# Patient Record
Sex: FEMALE | Race: WHITE | NOT HISPANIC OR LATINO
[De-identification: names, ages, dates, MRNs, and addresses within clinical notes are randomized per-mention and may not be internally consistent; named-entity substitution may affect disease eponyms.]

---

## 2017-02-10 ENCOUNTER — APPOINTMENT (OUTPATIENT)
Dept: NEUROLOGY | Facility: CLINIC | Age: 42
End: 2017-02-10

## 2018-12-11 ENCOUNTER — EMERGENCY (EMERGENCY)
Facility: HOSPITAL | Age: 43
LOS: 1 days | Discharge: HOME | End: 2018-12-11
Attending: EMERGENCY MEDICINE
Payer: COMMERCIAL

## 2018-12-11 VITALS
SYSTOLIC BLOOD PRESSURE: 136 MMHG | DIASTOLIC BLOOD PRESSURE: 82 MMHG | TEMPERATURE: 99 F | WEIGHT: 130.07 LBS | OXYGEN SATURATION: 99 % | RESPIRATION RATE: 18 BRPM | HEIGHT: 66 IN | HEART RATE: 98 BPM

## 2018-12-11 PROCEDURE — 99283 EMERGENCY DEPT VISIT LOW MDM: CPT

## 2018-12-11 PROCEDURE — 99284 EMERGENCY DEPT VISIT MOD MDM: CPT

## 2018-12-11 RX ORDER — ACETAMINOPHEN 500 MG
975 TABLET ORAL ONCE
Qty: 0 | Refills: 0 | Status: COMPLETED | OUTPATIENT
Start: 2018-12-11 | End: 2018-12-11

## 2018-12-11 RX ADMIN — Medication 975 MILLIGRAM(S): at 18:44

## 2018-12-11 NOTE — ED PROVIDER NOTE - NS ED ROS FT
ROS: no CP/SOB. no cough. no fever. no n/v/d/c. no abd pain. no rash. no bleeding. no urinary complaints. no weakness. no vision changes. +HA. no neck/back pain. no extremity swelling/deformity. No change in mental status.

## 2018-12-11 NOTE — ED PROVIDER NOTE - MEDICAL DECISION MAKING DETAILS
NEXUS/[place] CT rule negative, exam without any significant sign of head/facial trauma. no concern for sull fx or ICH. no concern for cervical spine injury. will give tylenol/ice. D/C
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
## 2018-12-11 NOTE — ED PROVIDER NOTE - PHYSICAL EXAMINATION
Gen: NAD, tearful, Ox3  Head: NCAT, no hematoma, bony step off  HEENT: PERRL, EOMI, oral mucosa moist, normal conjunctiva, neck supple, no hemotympanum, no racoon eye/bateman sign, mild ttp rt temporal region without step off, jaw lined up nontender  Lung: CTAB, no respiratory distress  CV: rrr, no murmur, Normal perfusion  Abd: soft, NTND  MSK: No edema, no visible deformities, no midline spinal ttp, mild rt trapezius ttp  Neuro: No focal neurologic deficits, CN II-XII intact, 5/5 global strength, sensation intact, gait intact   Skin: No rash   Psych: normal affect
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
## 2019-09-24 ENCOUNTER — TRANSCRIPTION ENCOUNTER (OUTPATIENT)
Age: 44
End: 2019-09-24

## 2019-11-11 NOTE — ED PROVIDER NOTE - PLAN OF CARE
1. Return to ED for worsening, progressive or any other concerning symptoms   2. Follow up with your primary care doctor in 2-3days   3. Take motrin 600mg every 6 hours as needed for pain and Take Tylenol up to 650 mg every 6 hours as needed for pain. No

## 2020-02-10 NOTE — ED ADULT NURSE NOTE - CAS DISCH ACCOMP BY
Detail Level: Detailed Quality 131: Pain Assessment And Follow-Up: Pain assessment NOT documented as being performed, documentation the patient is not eligible for a pain assessment using a standardized tool Quality 110: Preventive Care And Screening: Influenza Immunization: Influenza Immunization Administered during Influenza season Family

## 2021-03-05 PROBLEM — F41.9 ANXIETY DISORDER, UNSPECIFIED: Chronic | Status: ACTIVE | Noted: 2018-12-11

## 2021-05-24 ENCOUNTER — APPOINTMENT (OUTPATIENT)
Dept: RHEUMATOLOGY | Facility: CLINIC | Age: 46
End: 2021-05-24
Payer: MEDICAID

## 2021-05-24 VITALS
DIASTOLIC BLOOD PRESSURE: 80 MMHG | WEIGHT: 130 LBS | BODY MASS INDEX: 20.89 KG/M2 | HEART RATE: 82 BPM | OXYGEN SATURATION: 98 % | RESPIRATION RATE: 14 BRPM | SYSTOLIC BLOOD PRESSURE: 124 MMHG | HEIGHT: 66 IN | TEMPERATURE: 98.3 F

## 2021-05-24 PROCEDURE — 99203 OFFICE O/P NEW LOW 30 MIN: CPT

## 2021-05-25 NOTE — ASSESSMENT
[FreeTextEntry1] : Symptoms suggestive of fibromyalgia\par \par  -labs as below\par - encouraged proper diet, good sleep hygiene, exercise \par - c/w nortriptyline 100 mg daily, tizanidine prn per neurology\par - consider trail with gabapentin\par \par Discussed treatment plan with the patient. The patient was given the opportunity to ask questions and all questions were answered to their satisfaction.

## 2021-05-25 NOTE — HISTORY OF PRESENT ILLNESS
[FreeTextEntry1] : 45 year old female with PMH as listed below presents today for an initial evaluation\par \par Reports TBI 2018. Since then reports to have diffuse polyarthralgias, myalgia, fatigue. Denies swelling to the joints. Reports diffuse- pins/needles sensation. Following neurology- on nortriptyline 100 mg daily, tizanidine prn, cognitive therapy. Has had EMG tests: unremarkable per pt. \par \par Had COVID- 19 infection 02/2020. No hypoxia. No hospitalization. Reports symptoms progressively worse since. \par Symptoms start in AM and lasts the whole day. Dull/aching/burning/ pins/needles sensation.\par \par +mental fog\par +sicca symptoms\par \par denies fever, chills, denies chest pain, chest palpitations, denies sob, denies nausea, vomiting, abdominal pain, diarrhea, denies rashes,  denies blood clots,  raynauds

## 2021-05-25 NOTE — PHYSICAL EXAM
[General Appearance - Alert] : alert [General Appearance - In No Acute Distress] : in no acute distress [General Appearance - Well Nourished] : well nourished [General Appearance - Well Developed] : well developed [Sclera] : the sclera and conjunctiva were normal [PERRL With Normal Accommodation] : pupils were equal in size, round, and reactive to light [Extraocular Movements] : extraocular movements were intact [Outer Ear] : the ears and nose were normal in appearance [Neck Appearance] : the appearance of the neck was normal [Jugular Venous Distention Increased] : there was no jugular-venous distention [Auscultation Breath Sounds / Voice Sounds] : lungs were clear to auscultation bilaterally [Apical Impulse] : the apical impulse was normal [Heart Rate And Rhythm] : heart rate was normal and rhythm regular [Heart Sounds] : normal S1 and S2 [Bowel Sounds] : normal bowel sounds [Abdomen Soft] : soft [Abdomen Tenderness] : non-tender [Abdomen Mass (___ Cm)] : no abdominal mass palpated [No CVA Tenderness] : no ~M costovertebral angle tenderness [No Spinal Tenderness] : no spinal tenderness [Nail Clubbing] : no clubbing  or cyanosis of the fingernails [Motor Tone] : muscle strength and tone were normal [] : no rash [Skin Lesions] : no skin lesions [Sensation] : the sensory exam was normal to light touch and pinprick [Motor Exam] : the motor exam was normal [No Focal Deficits] : no focal deficits [Oriented To Time, Place, And Person] : oriented to person, place, and time [FreeTextEntry1] : +multiple tender points

## 2021-06-10 LAB
25(OH)D3 SERPL-MCNC: 39.3 NG/ML
ALBUMIN SERPL ELPH-MCNC: 4.7 G/DL
ALP BLD-CCNC: 58 U/L
ALT SERPL-CCNC: 17 U/L
ANA PAT FLD IF-IMP: ABNORMAL
ANA SER IF-ACNC: ABNORMAL
ANION GAP SERPL CALC-SCNC: 13 MMOL/L
AST SERPL-CCNC: 20 U/L
BASOPHILS # BLD AUTO: 0.07 K/UL
BASOPHILS NFR BLD AUTO: 1.5 %
BILIRUB SERPL-MCNC: 0.2 MG/DL
BUN SERPL-MCNC: 12 MG/DL
C3 SERPL-MCNC: 106 MG/DL
C4 SERPL-MCNC: 20 MG/DL
CALCIUM SERPL-MCNC: 9.9 MG/DL
CCP AB SER IA-ACNC: <8 UNITS
CHLORIDE SERPL-SCNC: 100 MMOL/L
CK SERPL-CCNC: 122 U/L
CO2 SERPL-SCNC: 26 MMOL/L
CREAT SERPL-MCNC: 0.97 MG/DL
CREAT SPEC-SCNC: 186 MG/DL
CREAT/PROT UR: 0.1 RATIO
CRP SERPL-MCNC: <3 MG/L
DSDNA AB SER-ACNC: <12 IU/ML
ENA RNP AB SER IA-ACNC: <0.2 AL
ENA SM AB SER IA-ACNC: <0.2 AL
ENA SS-A AB SER IA-ACNC: <0.2 AL
ENA SS-B AB SER IA-ACNC: <0.2 AL
EOSINOPHIL # BLD AUTO: 0.09 K/UL
EOSINOPHIL NFR BLD AUTO: 2 %
ERYTHROCYTE [SEDIMENTATION RATE] IN BLOOD BY WESTERGREN METHOD: 11 MM/HR
GLUCOSE SERPL-MCNC: 96 MG/DL
HCT VFR BLD CALC: 41.4 %
HGB BLD-MCNC: 12.7 G/DL
HISTONE AB SER QL: 0.6 UNITS
IMM GRANULOCYTES NFR BLD AUTO: 0.2 %
LYMPHOCYTES # BLD AUTO: 1.32 K/UL
LYMPHOCYTES NFR BLD AUTO: 28.7 %
MAN DIFF?: NORMAL
MCHC RBC-ENTMCNC: 30 PG
MCHC RBC-ENTMCNC: 30.7 GM/DL
MCV RBC AUTO: 97.6 FL
MONOCYTES # BLD AUTO: 0.41 K/UL
MONOCYTES NFR BLD AUTO: 8.9 %
NEUTROPHILS # BLD AUTO: 2.7 K/UL
NEUTROPHILS NFR BLD AUTO: 58.7 %
PLATELET # BLD AUTO: 308 K/UL
POTASSIUM SERPL-SCNC: 4.5 MMOL/L
PROT SERPL-MCNC: 7.3 G/DL
PROT UR-MCNC: 12 MG/DL
RBC # BLD: 4.24 M/UL
RBC # FLD: 13.1 %
RF+CCP IGG SER-IMP: NEGATIVE
RHEUMATOID FACT SER QL: <10 IU/ML
SODIUM SERPL-SCNC: 138 MMOL/L
THYROPEROXIDASE AB SERPL IA-ACNC: <10 IU/ML
TSH SERPL-ACNC: 2.19 UIU/ML
WBC # FLD AUTO: 4.6 K/UL

## 2021-06-11 ENCOUNTER — APPOINTMENT (OUTPATIENT)
Dept: RHEUMATOLOGY | Facility: CLINIC | Age: 46
End: 2021-06-11
Payer: MEDICAID

## 2021-06-11 VITALS
WEIGHT: 128 LBS | RESPIRATION RATE: 17 BRPM | OXYGEN SATURATION: 98 % | HEART RATE: 77 BPM | BODY MASS INDEX: 20.57 KG/M2 | TEMPERATURE: 98 F | HEIGHT: 66 IN | DIASTOLIC BLOOD PRESSURE: 82 MMHG | SYSTOLIC BLOOD PRESSURE: 110 MMHG

## 2021-06-11 PROCEDURE — 99214 OFFICE O/P EST MOD 30 MIN: CPT

## 2021-06-11 RX ORDER — DEXTROAMPHETAMINE SACCHARATE, AMPHETAMINE ASPARTATE, DEXTROAMPHETAMINE SULFATE, AND AMPHETAMINE SULFATE 5; 5; 5; 5 MG/1; MG/1; MG/1; MG/1
20 TABLET ORAL
Refills: 0 | Status: ACTIVE | COMMUNITY

## 2021-06-11 RX ORDER — LURASIDONE HYDROCHLORIDE 40 MG/1
40 TABLET, FILM COATED ORAL
Refills: 0 | Status: ACTIVE | COMMUNITY

## 2021-06-11 RX ORDER — LAMOTRIGINE 200 MG/1
200 TABLET ORAL
Refills: 0 | Status: ACTIVE | COMMUNITY

## 2021-06-11 NOTE — HISTORY OF PRESENT ILLNESS
[FreeTextEntry1] : Pt presenting today for a f.u visit. \par Labs from 05/2021 reviewed with pt. \par Continues to have  diffuse polyarthralgias, myalgia, <<fatigue.  ++brain fog\par denies fever, chills, denies chest pain, chest palpitations, denies sob, denies nausea, vomiting, abdominal pain, denies rashes\par No acute complaints today

## 2021-06-11 NOTE — ASSESSMENT
[FreeTextEntry1] : Symptoms suggestive of fibromyalgia\par \par - encouraged proper diet, good sleep hygiene, exercise \par - c/w nortriptyline 100 mg daily, tizanidine prn per neurology\par - start Lyrica 50mg BID (reviewed risk and benefits of medications)\par \par Discussed treatment plan with the patient. The patient was given the opportunity to ask questions and all questions were answered to their satisfaction.

## 2021-07-06 ENCOUNTER — APPOINTMENT (OUTPATIENT)
Dept: RHEUMATOLOGY | Facility: CLINIC | Age: 46
End: 2021-07-06
Payer: MEDICAID

## 2021-07-06 VITALS
HEART RATE: 70 BPM | HEIGHT: 66 IN | OXYGEN SATURATION: 99 % | DIASTOLIC BLOOD PRESSURE: 80 MMHG | TEMPERATURE: 98 F | WEIGHT: 130 LBS | SYSTOLIC BLOOD PRESSURE: 110 MMHG | BODY MASS INDEX: 20.89 KG/M2 | RESPIRATION RATE: 17 BRPM

## 2021-07-06 PROCEDURE — 99213 OFFICE O/P EST LOW 20 MIN: CPT

## 2021-07-06 NOTE — HISTORY OF PRESENT ILLNESS
[FreeTextEntry1] : Pt presenting today for a f.u visit. \par Recently started Lyrica 3 days ago and has noted improvement in symptoms so far. \par Continues to have  diffuse polyarthralgias, myalgia, <<fatigue.  ++brain fog\par denies fever, chills, denies chest pain, chest palpitations, denies sob, denies nausea, vomiting, abdominal pain, denies rashes\par No acute complaints today

## 2021-07-06 NOTE — ASSESSMENT
[FreeTextEntry1] : Symptoms suggestive of fibromyalgia\par \par - encouraged proper diet, good sleep hygiene, exercise \par - c/w nortriptyline 100 mg daily, tizanidine prn per neurology\par - c/w Lyrica 50mg BID (reviewed risk and benefits of medications)\par \par Discussed treatment plan with the patient. The patient was given the opportunity to ask questions and all questions were answered to their satisfaction.

## 2021-07-26 RX ORDER — PREGABALIN 50 MG/1
50 CAPSULE ORAL
Qty: 60 | Refills: 0 | Status: DISCONTINUED | COMMUNITY
Start: 2021-06-11 | End: 2021-07-26

## 2021-07-26 RX ORDER — NORTRIPTYLINE HYDROCHLORIDE 50 MG/1
50 CAPSULE ORAL
Refills: 0 | Status: DISCONTINUED | COMMUNITY
End: 2021-07-26

## 2021-08-24 ENCOUNTER — RX RENEWAL (OUTPATIENT)
Age: 46
End: 2021-08-24

## 2021-09-15 ENCOUNTER — APPOINTMENT (OUTPATIENT)
Dept: RHEUMATOLOGY | Facility: CLINIC | Age: 46
End: 2021-09-15

## 2021-10-08 ENCOUNTER — APPOINTMENT (OUTPATIENT)
Dept: RHEUMATOLOGY | Facility: CLINIC | Age: 46
End: 2021-10-08

## 2021-11-17 ENCOUNTER — APPOINTMENT (OUTPATIENT)
Dept: RHEUMATOLOGY | Facility: CLINIC | Age: 46
End: 2021-11-17
Payer: MEDICAID

## 2021-11-17 VITALS
OXYGEN SATURATION: 99 % | DIASTOLIC BLOOD PRESSURE: 80 MMHG | SYSTOLIC BLOOD PRESSURE: 140 MMHG | BODY MASS INDEX: 21.69 KG/M2 | WEIGHT: 135 LBS | TEMPERATURE: 98.4 F | HEART RATE: 80 BPM | RESPIRATION RATE: 17 BRPM | HEIGHT: 66 IN

## 2021-11-17 PROCEDURE — 99213 OFFICE O/P EST LOW 20 MIN: CPT

## 2021-11-23 NOTE — HISTORY OF PRESENT ILLNESS
[FreeTextEntry1] : Pt presenting today for a f.u visit. No acute complaints today. \par Off Lyrica- reports getting very drowsy. \par On gabapentin 300mg BID,  nortriptyline 100 mg daily, tizanidine prn per neurology. \par Continues to have  diffuse polyarthralgias, myalgia, <<fatigue.  ++brain fog\par denies fever, chills, denies chest pain, chest palpitations, denies sob, denies nausea, vomiting, abdominal pain, denies rashes. 
English

## 2022-01-21 ENCOUNTER — APPOINTMENT (OUTPATIENT)
Dept: RHEUMATOLOGY | Facility: CLINIC | Age: 47
End: 2022-01-21
Payer: MEDICAID

## 2022-01-21 PROCEDURE — 99214 OFFICE O/P EST MOD 30 MIN: CPT | Mod: 95

## 2022-01-21 NOTE — ASSESSMENT
[FreeTextEntry1] : Fibromyalgia\par \par Off Lyrica- reports getting very drowsy. \par \par - encouraged proper diet, good sleep hygiene, exercise \par - c/w nortriptyline 100 mg daily, tizanidine prn per neurology\par - c/w gabapentin to 300mg in AM, 600 mg in PM\par - meloxicam 15mg daily \par \par Discussed treatment plan with the patient. The patient was given the opportunity to ask questions and all questions were answered to their satisfaction.

## 2022-01-21 NOTE — HISTORY OF PRESENT ILLNESS
[Home] : at home, [unfilled] , at the time of the visit. [Medical Office: ([facility])___] : at the medical office located in  [Verbal consent obtained from patient] : the patient, [unfilled] [FreeTextEntry1] : Pt presenting today for a f.u visit. No acute complaints today. \par Off Lyrica- reports getting very drowsy. \par On gabapentin 300mg in AM, 600 mg in PM. \par Also taking nortriptyline 100 mg daily, tizanidine prn per neurology. \par Takes NSAIDS, Tylenol prn for pain\par denies fever, chills, denies chest pain, chest palpitations, denies sob, denies nausea, vomiting, abdominal pain, denies rashes.

## 2022-04-19 ENCOUNTER — APPOINTMENT (OUTPATIENT)
Dept: ORTHOPEDIC SURGERY | Facility: CLINIC | Age: 47
End: 2022-04-19
Payer: MEDICARE

## 2022-04-19 VITALS — BODY MASS INDEX: 21.69 KG/M2 | WEIGHT: 135 LBS | HEIGHT: 66 IN

## 2022-04-19 DIAGNOSIS — Z78.9 OTHER SPECIFIED HEALTH STATUS: ICD-10-CM

## 2022-04-19 PROCEDURE — 72040 X-RAY EXAM NECK SPINE 2-3 VW: CPT

## 2022-04-19 PROCEDURE — 73030 X-RAY EXAM OF SHOULDER: CPT | Mod: LT

## 2022-04-19 PROCEDURE — 99204 OFFICE O/P NEW MOD 45 MIN: CPT

## 2022-04-19 NOTE — PHYSICAL EXAM
[de-identified] : Neurologic: normal coordination, normal DTR UE/LE , normal sensation, normal mood and affect, orientated and able to communicate. \par Skin: normal skin, no rash, no ulcers and no lesions. \par Lymphatic: no obvious lymphadenopathy in areas examined. \par Constitutional: well developed and well nourished. \par Cardiovascular: peripheral vascular exam is grossly normal. \par Pulmonary: no respiratory distress, lungs clear to auscultation bilaterally. \par Abdomen: normal bowel sounds, non-tender, no HSM and no mass. \par \par Cervical Spine: Central paraspinal tenderness\par Left-sided radicular paresthesia and pain \par X-ray of c-spine, 2 view: kyphosis and spasm\par \par Left shoulder x-ray, 3view: ac arthrosis \par \par \par

## 2022-04-19 NOTE — DISCUSSION/SUMMARY
[de-identified] : Follow up with Dr. Hare after MRI of cervical spine to eval HNP \par \par Patient will continue physical therapy - added specific shoulder exercises to be done in prescription\par \par Reviewed all images with patient. \par \par Home Exercise\par The patient is instructed on a home exercise program.\par \par ENE HERNANDEZ Acting as a Scribe for Dr. Mixon\par I, Ene Hernandez, attest that this documentation has been prepared under the direction and in the presence of Provider Bernard Mixon MD.\par \par Activity Modification\par The patient was advised to modify their activities.\par \par Dx / Natural History\par The patient was advised of the diagnosis.  The natural history of the pathology was explained in full to the patient in layman's terms.  Several different treatment options were discussed and explained in full to the patient including the risks and benefits of both surgical and non-surgical treatments.  All questions and concerns were answered.\par \par Pain Guide Activities\par The patient was advised to let pain guide the gradual advancement of activities.\par \par RICE\par I explained to the patient that rest, ice, compression, and elevation would benefit them.  They may return to activity after follow-up or when they no longer have any pain.

## 2022-04-19 NOTE — HISTORY OF PRESENT ILLNESS
[de-identified] : The patient is a 46 year old R hand dominant F who presents today complaining of left shoulder pain.  Left superior shoulder pain since February, now with tightening of her shoulder. Pain worsened over last 10 days or so. Pain radiating down her shoulder blade. Pain with all range of motion. Admits to intermittent tingling in forearm.  Here with [facility] MRI of left shoulder.\par Date of Injury/Onset: 02/2022\par Pain:    At Rest: 5/10 \par With Activity:  9/10 \par Mechanism of injury: none\par This is not a Work Related Injury being treated under Worker's Compensation.\par This is not an athletic injury occurring associated with an interscholastic or organized sports team.\par Quality of symptoms: throbbing , aching , pulling \par Improves with: rest, ice \par Worse with: reaching , lifting , pushing \par Prior treatment: none\par Prior Imaging: L shoulder MRI - [facility] \par Out of work/sport: _, since _\par School/Sport/Position/Occupation: Disabled \par Additional Information: None\par  \par

## 2022-04-19 NOTE — ASSESSMENT
[FreeTextEntry1] : 4/5/22 [facility] MRI LEFT SHOULDER\par No rotator cuff or labral tear.\par Mild AC arthrosis and bone marrow edema.

## 2022-04-26 ENCOUNTER — RESULT REVIEW (OUTPATIENT)
Age: 47
End: 2022-04-26

## 2022-05-03 ENCOUNTER — APPOINTMENT (OUTPATIENT)
Dept: ORTHOPEDIC SURGERY | Facility: CLINIC | Age: 47
End: 2022-05-03
Payer: MEDICARE

## 2022-05-03 VITALS — HEIGHT: 66 IN | WEIGHT: 135 LBS | BODY MASS INDEX: 21.69 KG/M2

## 2022-05-03 DIAGNOSIS — R20.2 PARESTHESIA OF SKIN: ICD-10-CM

## 2022-05-03 PROCEDURE — 20611 DRAIN/INJ JOINT/BURSA W/US: CPT

## 2022-05-03 PROCEDURE — 99214 OFFICE O/P EST MOD 30 MIN: CPT | Mod: 25

## 2022-05-03 NOTE — HISTORY OF PRESENT ILLNESS
[de-identified] : The patient is a 46 year old R hand dominant F who presents today complaining of left shoulder pain.\par Date of Injury/Onset: 02/2022\par Pain: At Rest: 3-4/10 \par With Activity: 9/10 \par Mechanism of injury: none\par This is not a Work Related Injury being treated under Worker's Compensation.\par This is not an athletic injury occurring associated with an interscholastic or organized sports team.\par Quality of symptoms: throbbing , aching , pulling \par Improves with: rest, ice \par Worse with: reaching , lifting , pushing \par Prior treatment: none\par Prior Imaging: L shoulder MRI - [facility] \par Out of work/sport: _, since _\par School/Sport/Position/Occupation: Disabled \par Additional Information: None\par

## 2022-05-03 NOTE — ASSESSMENT
[FreeTextEntry1] : 4/5/22 [facility] MRI LEFT SHOULDER\par No rotator cuff or labral tear.\par Mild AC arthrosis and bone marrow edema. \par \par MRI C-SPINE [facility] 4/26/22\par Multilevel degenerative change without significant spinal canal stenosis.\par Mild neuroforaminal stenosis on the right at C4-C5 and C5/C6.

## 2022-05-03 NOTE — PHYSICAL EXAM
[de-identified] : Neurologic: normal coordination, normal DTR UE/LE , normal sensation, normal mood and affect, orientated and able to communicate. \par Skin: normal skin, no rash, no ulcers and no lesions. \par Lymphatic: no obvious lymphadenopathy in areas examined. \par Constitutional: well developed and well nourished. \par Cardiovascular: peripheral vascular exam is grossly normal. \par Pulmonary: no respiratory distress, lungs clear to auscultation bilaterally. \par Abdomen: normal bowel sounds, non-tender, no HSM and no mass. \par \par Cervical Spine: Central paraspinal tenderness\par Left-sided radicular paresthesia and pain \par X-ray of c-spine, 2 view: kyphosis and spasm\par \par Left Shoulder: AC joint  tenderness \par Left shoulder x-ray, 3view: ac arthrosis \par \par \par

## 2022-05-03 NOTE — DISCUSSION/SUMMARY
[Medication Risks Reviewed] : Medication risks reviewed [de-identified] : Reviewed all images with patient. \par \par *LEFT AC JOINT*  corticosteroid injection administered, using ultrasound for proper placement. \par The risks, benefits, and alternatives to cortisone injection were explained in full to the patient.  Risks outlined include but are not limited to infection, sepsis, bleeding, scarring, skin discoloration, temporary increase in pain, syncopal episode, failure to resolve symptoms, allergic reaction, symptom recurrence, and elevation of blood sugar in diabetics.  Patient understood the risks.  All questions were answered.  After discussion of options, patient requested an injection.  Oral informed consent was obtained and sterile prep was done of the injection site.  A mixture of 40mg of Kenalog, 3cc of 1% Lidocaine was sterilely prepared by me.  Sterile technique was used to introduce the mixture into the left shoulder. Ultrasound was used for proper needle placement.  Patient tolerated the procedure well.  Advised to ice the injection site this evening. \par \par Referred patient to physical therapy for strengthening. \par \par Follow up as needed. \par \par -----------------------------------------------\par Home Exercise\par The patient is instructed on a home exercise program.\par \par ENE HERNANDEZ Acting as a Scribe for Dr. Mixon\par I, Ene Hernandez, attest that this documentation has been prepared under the direction and in the presence of Provider Bernard Mixon MD.\par \par Activity Modification\par The patient was advised to modify their activities.\par \par Dx / Natural History\par The patient was advised of the diagnosis.  The natural history of the pathology was explained in full to the patient in layman's terms.  Several different treatment options were discussed and explained in full to the patient including the risks and benefits of both surgical and non-surgical treatments.  All questions and concerns were answered.\par \par Pain Guide Activities\par The patient was advised to let pain guide the gradual advancement of activities.\par \par RICE\par I explained to the patient that rest, ice, compression, and elevation would benefit them.  They may return to activity after follow-up or when they no longer have any pain.

## 2022-05-17 ENCOUNTER — APPOINTMENT (OUTPATIENT)
Dept: PAIN MANAGEMENT | Facility: CLINIC | Age: 47
End: 2022-05-17
Payer: MEDICARE

## 2022-05-17 VITALS — WEIGHT: 135 LBS | BODY MASS INDEX: 21.69 KG/M2 | HEIGHT: 66 IN

## 2022-05-17 PROCEDURE — 99203 OFFICE O/P NEW LOW 30 MIN: CPT

## 2022-05-17 NOTE — DATA REVIEWED
[MRI] : MRI [Cervical Spine] : cervical spine [Report was reviewed and noted in the chart] : The report was reviewed and noted in the chart [I reviewed the films/CD] : I reviewed the films/CD

## 2022-05-18 NOTE — REASON FOR VISIT
[Initial Consultation] : an initial pain management consultation [FreeTextEntry2] : Ref. From Dr. Mixon

## 2022-05-18 NOTE — ASSESSMENT
[FreeTextEntry1] : A discussion regarding available pain management treatment options occurred with the patient.  These included interventional, rehabilitative, pharmacological, and alternative modalities. We will proceed with the following: \par \par Interventional treatment options: \par - Discussed trial of TPI for cervical myofascial pain with failure of conservative care \par - Briefly discussed cervical facet blocks / RFA with refractory symptoms \par - see additional instructions below \par \par Rehabilitative options: \par - initiate trial of physical therapy \par - participation in active HEP was discussed \par \par Medication based treatment options: \par - continue OTC NSAIDs PRN \par - would incorporate OTC analgesics PRN \par - caution with any potentially sedating rx: due to ongoing neurological workup (new onset seizure?) \par - see additional instructions below \par \par Complementary treatment options: \par - Lifestyle modifications discussed\par \par Additional treatment recommendations as follows: \par - patient with follow up 6-8 weeks to assess response to conservative care \par \par The documentation recorded by the scribe, in my presence, accurately reflects the service I personally performed and the decisions made by me with my edits as appropriate.\par \par I, Kalyani Green acting as scribe, attest that this documentation has been prepared under the direction and in the presence of Provider Joshua Hare DO.
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
## 2022-05-18 NOTE — PHYSICAL EXAM
[Normal Coordination] : normal coordination [Normal Mood and Affect] : normal mood and affect [Orientated] : orientated [Able to Communicate] : able to communicate [Well Developed] : well developed [Well Nourished] : well nourished [Extension] : extension [Rotation to left] : rotation to left [] : negative Gutiérrez reflex [FreeTextEntry8] : left occipital ridge tenderness

## 2022-06-14 ENCOUNTER — APPOINTMENT (OUTPATIENT)
Dept: ORTHOPEDIC SURGERY | Facility: CLINIC | Age: 47
End: 2022-06-14

## 2022-06-24 ENCOUNTER — APPOINTMENT (OUTPATIENT)
Dept: RHEUMATOLOGY | Facility: CLINIC | Age: 47
End: 2022-06-24

## 2022-06-28 ENCOUNTER — APPOINTMENT (OUTPATIENT)
Dept: PAIN MANAGEMENT | Facility: CLINIC | Age: 47
End: 2022-06-28

## 2022-06-28 VITALS — WEIGHT: 135 LBS | HEIGHT: 66 IN | BODY MASS INDEX: 21.69 KG/M2

## 2022-06-28 PROCEDURE — 99213 OFFICE O/P EST LOW 20 MIN: CPT

## 2022-06-29 NOTE — REASON FOR VISIT
[Follow-Up Visit] : a follow-up pain management visit [FreeTextEntry2] : follow up to conservative care
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
## 2022-06-29 NOTE — ASSESSMENT
[FreeTextEntry1] : A discussion regarding available pain management treatment options occurred with the patient.  These included interventional, rehabilitative, pharmacological, and alternative modalities. We will proceed with the following: \par \par Interventional treatment options: \par - once again advised trial of TPI for cervical myofascial pain; patient will discuss with treating neurologist \par - Briefly discussed cervical facet blocks / RFA with failure of conservative care, she wishes to defer at present time \par - see additional instructions below \par \par Rehabilitative options: \par - continue trial of physical therapy \par - participation in active HEP was discussed \par \par Medication based treatment options: \par - continue OTC NSAIDs PRN \par - would incorporate OTC topical analgesics PRN \par - D/C Flexeril; start methocarbamol 500mg TID PRN spasm \par - continue gabapentin 600mg daily; further titration based upon clinical response \par - see additional instructions below \par \par Complementary treatment options: \par - Lifestyle modifications discussed\par - continue home TENS therapy \par - patient will consider cervical traction \par \par Additional treatment recommendations as follows: \par - F/U with neuro as directed\par - F/U in 6 weeks or PRN \par \par The documentation recorded by the scribe, in my presence, accurately reflects the service I personally performed and the decisions made by me with my edits as appropriate.\par \par I, Kalyani Green acting as scribe, attest that this documentation has been prepared under the direction and in the presence of Provider Joshua Hare DO.

## 2022-07-12 ENCOUNTER — APPOINTMENT (OUTPATIENT)
Dept: RHEUMATOLOGY | Facility: CLINIC | Age: 47
End: 2022-07-12

## 2022-07-12 VITALS
DIASTOLIC BLOOD PRESSURE: 70 MMHG | HEIGHT: 66 IN | OXYGEN SATURATION: 99 % | RESPIRATION RATE: 17 BRPM | HEART RATE: 94 BPM | SYSTOLIC BLOOD PRESSURE: 110 MMHG | TEMPERATURE: 97.9 F

## 2022-07-12 PROCEDURE — 99214 OFFICE O/P EST MOD 30 MIN: CPT

## 2022-07-12 RX ORDER — TIZANIDINE 2 MG/1
2 TABLET ORAL
Qty: 30 | Refills: 2 | Status: DISCONTINUED | COMMUNITY
Start: 2021-06-11 | End: 2022-07-12

## 2022-07-19 ENCOUNTER — APPOINTMENT (OUTPATIENT)
Dept: ORTHOPEDIC SURGERY | Facility: CLINIC | Age: 47
End: 2022-07-19

## 2022-07-19 VITALS — WEIGHT: 135 LBS | HEIGHT: 66 IN | BODY MASS INDEX: 21.69 KG/M2

## 2022-07-19 DIAGNOSIS — M25.512 PAIN IN LEFT SHOULDER: ICD-10-CM

## 2022-07-19 PROCEDURE — 20611 DRAIN/INJ JOINT/BURSA W/US: CPT

## 2022-07-19 PROCEDURE — 99214 OFFICE O/P EST MOD 30 MIN: CPT | Mod: 25

## 2022-07-19 PROCEDURE — 99213 OFFICE O/P EST LOW 20 MIN: CPT | Mod: 25

## 2022-07-19 NOTE — HISTORY OF PRESENT ILLNESS
[de-identified] : The patient is a 46 year old R hand dominant F who presents today complaining of left shoulder pain.\par Date of Injury/Onset: 02/2022\par Pain: At Rest: 6/10 \par With Activity: 9/10 \par Mechanism of injury: none\par This is not a Work Related Injury being treated under Worker's Compensation.\par This is not an athletic injury occurring associated with an interscholastic or organized sports team.\par Quality of symptoms: throbbing , aching , pulling \par Improves with: PT, cortisone injection  \par Worse with: reaching , lifting , pushing \par Treatment/Imaging since last visit: PT, myofascial release therapy  \par Reports available today: N/A\par Out of work/sport: _, since _\par School/Sport/Position/Occupation: Disabled \par Changes since last visit: Significant increase in pain over the last week or two. \par Additional Information: None

## 2022-07-19 NOTE — PHYSICAL EXAM
[de-identified] : Neurologic: normal coordination, normal DTR UE/LE , normal sensation, normal mood and affect, orientated and able to communicate. \par Skin: normal skin, no rash, no ulcers and no lesions. \par Lymphatic: no obvious lymphadenopathy in areas examined. \par Constitutional: well developed and well nourished. \par Cardiovascular: peripheral vascular exam is grossly normal. \par Pulmonary: no respiratory distress, lungs clear to auscultation bilaterally. \par Abdomen: normal bowel sounds, non-tender, no HSM and no mass. \par \par Cervical Spine: Central paraspinal tenderness\par Left-sided radicular paresthesia and pain \par X-ray of c-spine, 2 view: kyphosis and spasm\par \par Left Shoulder: AC joint  tenderness \par Left shoulder x-ray, 3view: ac arthrosis \par \par \par

## 2022-07-19 NOTE — DISCUSSION/SUMMARY
[Medication Risks Reviewed] : Medication risks reviewed [de-identified] : Reviewed all images with patient. \par \par *LEFT AC JOINT*  corticosteroid injection administered, using ultrasound for proper placement. \par The risks, benefits, and alternatives to cortisone injection were explained in full to the patient.  Risks outlined include but are not limited to infection, sepsis, bleeding, scarring, skin discoloration, temporary increase in pain, syncopal episode, failure to resolve symptoms, allergic reaction, symptom recurrence, and elevation of blood sugar in diabetics.  Patient understood the risks.  All questions were answered.  After discussion of options, patient requested an injection.  Oral informed consent was obtained and sterile prep was done of the injection site.  A mixture of 40mg of Kenalog, 3cc of 1% Lidocaine was sterilely prepared by me.  Sterile technique was used to introduce the mixture into the left shoulder. Ultrasound was used for proper needle placement.  Patient tolerated the procedure well.  Advised to ice the injection site this evening. \par \par Continue physical therapy for strengthening. \par \par Follow up 2 months\par \par -----------------------------------------------\par Home Exercise\par The patient is instructed on a home exercise program.\par \par ENE HERNANDEZ Acting as a Scribe for Dr. Mixon\par I, Ene Hernandez, attest that this documentation has been prepared under the direction and in the presence of Provider Bernard Mixon MD.\par \par Activity Modification\par The patient was advised to modify their activities.\par \par Dx / Natural History\par The patient was advised of the diagnosis.  The natural history of the pathology was explained in full to the patient in layman's terms.  Several different treatment options were discussed and explained in full to the patient including the risks and benefits of both surgical and non-surgical treatments.  All questions and concerns were answered.\par \par Pain Guide Activities\par The patient was advised to let pain guide the gradual advancement of activities.\par \par RICE\par I explained to the patient that rest, ice, compression, and elevation would benefit them.  They may return to activity after follow-up or when they no longer have any pain.

## 2022-07-25 ENCOUNTER — APPOINTMENT (OUTPATIENT)
Dept: NEUROLOGY | Facility: CLINIC | Age: 47
End: 2022-07-25

## 2022-07-25 DIAGNOSIS — M79.622 PAIN IN LEFT UPPER ARM: ICD-10-CM

## 2022-07-25 PROCEDURE — 95886 MUSC TEST DONE W/N TEST COMP: CPT

## 2022-07-25 PROCEDURE — 95911 NRV CNDJ TEST 9-10 STUDIES: CPT

## 2022-07-26 NOTE — ASSESSMENT
[FreeTextEntry1] : Fibromyalgia\par \par Off Lyrica- reports getting very drowsy. \par Off tizanidine- no longer helping\par \par - encouraged proper diet, good sleep hygiene, exercise \par - c/w nortriptyline 100 mg daily\par - trail with methocarbamol 500mg daily- TID as tolerated \par - c/w gabapentin to 300mg in AM, 600 mg in PM\par - meloxicam 15mg daily \par \par Discussed treatment plan with the patient. The patient was given the opportunity to ask questions and all questions were answered to their satisfaction.

## 2022-08-26 ENCOUNTER — APPOINTMENT (OUTPATIENT)
Dept: ORTHOPEDIC SURGERY | Facility: CLINIC | Age: 47
End: 2022-08-26

## 2022-08-26 ENCOUNTER — TRANSCRIPTION ENCOUNTER (OUTPATIENT)
Age: 47
End: 2022-08-26

## 2022-08-26 PROCEDURE — 99214 OFFICE O/P EST MOD 30 MIN: CPT

## 2022-08-26 NOTE — PHYSICAL EXAM
[de-identified] : Neurologic: normal coordination, normal DTR UE/LE , normal sensation, normal mood and affect, orientated and able to communicate. \par Skin: normal skin, no rash, no ulcers and no lesions. \par Lymphatic: no obvious lymphadenopathy in areas examined. \par Constitutional: well developed and well nourished. \par Cardiovascular: peripheral vascular exam is grossly normal. \par Pulmonary: no respiratory distress, lungs clear to auscultation bilaterally. \par Abdomen: normal bowel sounds, non-tender, no HSM and no mass. \par \par Cervical Spine: Central paraspinal tenderness\par Left-sided radicular paresthesia and pain \par X-ray of c-spine, 2 view: kyphosis and spasm\par \par Left Shoulder: AC joint tenderness \par Left shoulder x-ray, 3view: ac arthrosis  done

## 2022-08-26 NOTE — DISCUSSION/SUMMARY
[de-identified] : EMG results were reviewed with the patient and confirm her symptoms are related to her spine. She will follow up with pain management. \par \par "Written by Nicola Chapin, acting as Scribe for Bernard Mixon M.D" \par \par Home Exercise \par \par  The patient is instructed on a home exercise program. \par  \par RICE \par I explained to the patient that rest, ice, compression, and elevation would benefit them.  They may return to activity after follow-up or when they no longer have any pain. \par  \par Pain Guide Activities \par The patient was advised to let pain guide the gradual advancement of activities. \par  \par Activity Modification \par The patient was advised to modify their activities. \par  \par Dx / Natural History \par The patient was advised of the diagnosis.  The natural history of the pathology was explained in full to the patient in layman's terms.  Several different treatment options were discussed and explained in full to the patient including the risks and benefits of both surgical and non-surgical treatments.  All questions and concerns were answered.\par

## 2022-09-27 ENCOUNTER — APPOINTMENT (OUTPATIENT)
Dept: ORTHOPEDIC SURGERY | Facility: CLINIC | Age: 47
End: 2022-09-27

## 2022-10-04 ENCOUNTER — APPOINTMENT (OUTPATIENT)
Dept: PAIN MANAGEMENT | Facility: CLINIC | Age: 47
End: 2022-10-04

## 2022-10-12 ENCOUNTER — APPOINTMENT (OUTPATIENT)
Dept: RHEUMATOLOGY | Facility: CLINIC | Age: 47
End: 2022-10-12

## 2022-10-12 VITALS
HEART RATE: 102 BPM | DIASTOLIC BLOOD PRESSURE: 75 MMHG | HEIGHT: 66 IN | SYSTOLIC BLOOD PRESSURE: 115 MMHG | OXYGEN SATURATION: 98 % | TEMPERATURE: 98.1 F

## 2022-10-12 PROCEDURE — 99214 OFFICE O/P EST MOD 30 MIN: CPT

## 2022-10-12 NOTE — ASSESSMENT
[FreeTextEntry1] : Fibromyalgia\par \par Off Lyrica- reports getting very drowsy\par Off tizanidine- no longer helping\par \par - encouraged proper diet, good sleep hygiene, exercise \par - c/w nortriptyline 100 mg daily\par - c/w methocarbamol 500mg daily- TID as tolerated \par - c/w gabapentin to 300mg in AM, 600 mg in PM\par - meloxicam 15mg daily \par - c/w pain management f.u\par \par Discussed treatment plan with the patient. The patient was given the opportunity to ask questions and all questions were answered to their satisfaction.

## 2022-10-12 NOTE — HISTORY OF PRESENT ILLNESS
[FreeTextEntry1] : Pt presenting today for a f.u visit for fibromyalgia. No acute complaints today. \par Off Lyrica- reports getting very drowsy. \par On gabapentin 300mg in AM, 600 mg in PM. Also taking nortriptyline 100 mg daily. LV started on methocarbamol 500mg daily- TID. Also takes NSAIDS, Tylenol prn for pain. Tolerating medication well. No side effects. \par Has started seeing pain management. s/p trigger point injections, myofascial release with improvement in pain. \par denies fever, chills, denies chest pain, chest palpitations, denies sob, denies nausea, vomiting, abdominal pain, denies rashes.

## 2022-12-05 ENCOUNTER — RX RENEWAL (OUTPATIENT)
Age: 47
End: 2022-12-05

## 2023-01-04 ENCOUNTER — RX RENEWAL (OUTPATIENT)
Age: 48
End: 2023-01-04

## 2023-04-11 ENCOUNTER — APPOINTMENT (OUTPATIENT)
Dept: RHEUMATOLOGY | Facility: CLINIC | Age: 48
End: 2023-04-11
Payer: MEDICARE

## 2023-04-11 VITALS
OXYGEN SATURATION: 98 % | SYSTOLIC BLOOD PRESSURE: 125 MMHG | DIASTOLIC BLOOD PRESSURE: 75 MMHG | HEART RATE: 89 BPM | TEMPERATURE: 98.5 F

## 2023-04-11 DIAGNOSIS — Z00.00 ENCOUNTER FOR GENERAL ADULT MEDICAL EXAMINATION W/OUT ABNORMAL FINDINGS: ICD-10-CM

## 2023-04-11 PROCEDURE — 99214 OFFICE O/P EST MOD 30 MIN: CPT | Mod: 25

## 2023-04-11 PROCEDURE — 36415 COLL VENOUS BLD VENIPUNCTURE: CPT

## 2023-04-12 NOTE — HISTORY OF PRESENT ILLNESS
[FreeTextEntry1] : Pt presenting today for a f.u visit for fibromyalgia. Last seen 10/2022. Chart reviewed since LV. \par No acute complaints today. \par On gabapentin 300mg in AM, 600 mg in PM. Also taking nortriptyline 100 mg daily, methocarbamol 500mg daily- TID. Also takes NSAIDS, Tylenol prn for pain. Tolerating medication well. No side effects. \par Following pain management. s/p trigger point injections, myofascial release with improvement in pain. \par denies fever, chills, denies chest pain, chest palpitations, denies sob, denies nausea, vomiting, abdominal pain, denies rashes. \par Last labs are from 2021. Needs to repeat labs.

## 2023-04-12 NOTE — ASSESSMENT
[FreeTextEntry1] : Fibromyalgia\par \par Off Lyrica- reports getting very drowsy\par Off tizanidine- no longer helping\par \par - encouraged proper diet, good sleep hygiene, exercise \par - c/w nortriptyline 100 mg daily\par - c/w methocarbamol 500mg daily- TID as tolerated \par - c/w gabapentin to 300mg in AM, 600 mg in PM\par - meloxicam 15mg daily \par - c/w pain management f.u\par - repeat labs as below\par \par Discussed treatment plan with the patient. The patient was given the opportunity to ask questions and all questions were answered to their satisfaction.

## 2023-07-12 LAB
ALBUMIN SERPL ELPH-MCNC: 4.7 G/DL
ALP BLD-CCNC: 47 U/L
ALT SERPL-CCNC: 19 U/L
ANION GAP SERPL CALC-SCNC: 12 MMOL/L
AST SERPL-CCNC: 15 U/L
BASOPHILS # BLD AUTO: 0.09 K/UL
BASOPHILS NFR BLD AUTO: 1.4 %
BILIRUB SERPL-MCNC: <0.2 MG/DL
BUN SERPL-MCNC: 13 MG/DL
CALCIUM SERPL-MCNC: 9.7 MG/DL
CCP AB SER IA-ACNC: <8 UNITS
CHLORIDE SERPL-SCNC: 101 MMOL/L
CK SERPL-CCNC: 108 U/L
CO2 SERPL-SCNC: 26 MMOL/L
CREAT SERPL-MCNC: 0.73 MG/DL
CRP SERPL-MCNC: <3 MG/L
EGFR: 103 ML/MIN/1.73M2
EOSINOPHIL # BLD AUTO: 0.1 K/UL
EOSINOPHIL NFR BLD AUTO: 1.6 %
ERYTHROCYTE [SEDIMENTATION RATE] IN BLOOD BY WESTERGREN METHOD: 2 MM/HR
GLUCOSE SERPL-MCNC: 77 MG/DL
HCT VFR BLD CALC: 39.1 %
HGB BLD-MCNC: 12.4 G/DL
IMM GRANULOCYTES NFR BLD AUTO: 0.3 %
LYMPHOCYTES # BLD AUTO: 1.88 K/UL
LYMPHOCYTES NFR BLD AUTO: 29.4 %
MAN DIFF?: NORMAL
MCHC RBC-ENTMCNC: 30.5 PG
MCHC RBC-ENTMCNC: 31.7 GM/DL
MCV RBC AUTO: 96.3 FL
MONOCYTES # BLD AUTO: 0.38 K/UL
MONOCYTES NFR BLD AUTO: 5.9 %
NEUTROPHILS # BLD AUTO: 3.92 K/UL
NEUTROPHILS NFR BLD AUTO: 61.4 %
PLATELET # BLD AUTO: 259 K/UL
POTASSIUM SERPL-SCNC: 4.1 MMOL/L
PROT SERPL-MCNC: 6.7 G/DL
RBC # BLD: 4.06 M/UL
RBC # FLD: 12.2 %
RF+CCP IGG SER-IMP: NEGATIVE
RHEUMATOID FACT SER QL: <10 IU/ML
SODIUM SERPL-SCNC: 139 MMOL/L
TSH SERPL-ACNC: 1.85 UIU/ML
WBC # FLD AUTO: 6.39 K/UL

## 2023-08-02 ENCOUNTER — TRANSCRIPTION ENCOUNTER (OUTPATIENT)
Age: 48
End: 2023-08-02

## 2023-09-06 ENCOUNTER — TRANSCRIPTION ENCOUNTER (OUTPATIENT)
Age: 48
End: 2023-09-06

## 2023-09-12 ENCOUNTER — APPOINTMENT (OUTPATIENT)
Dept: RHEUMATOLOGY | Facility: CLINIC | Age: 48
End: 2023-09-12
Payer: MEDICARE

## 2023-09-12 VITALS
HEIGHT: 66 IN | OXYGEN SATURATION: 97 % | DIASTOLIC BLOOD PRESSURE: 80 MMHG | SYSTOLIC BLOOD PRESSURE: 120 MMHG | WEIGHT: 125 LBS | HEART RATE: 118 BPM | TEMPERATURE: 98.4 F | BODY MASS INDEX: 20.09 KG/M2 | RESPIRATION RATE: 17 BRPM

## 2023-09-12 PROCEDURE — 99214 OFFICE O/P EST MOD 30 MIN: CPT

## 2023-11-17 ENCOUNTER — TRANSCRIPTION ENCOUNTER (OUTPATIENT)
Age: 48
End: 2023-11-17

## 2023-12-12 ENCOUNTER — TRANSCRIPTION ENCOUNTER (OUTPATIENT)
Age: 48
End: 2023-12-12

## 2024-02-09 ENCOUNTER — APPOINTMENT (OUTPATIENT)
Dept: RHEUMATOLOGY | Facility: CLINIC | Age: 49
End: 2024-02-09
Payer: MEDICARE

## 2024-02-09 VITALS
OXYGEN SATURATION: 97 % | SYSTOLIC BLOOD PRESSURE: 110 MMHG | TEMPERATURE: 98 F | RESPIRATION RATE: 17 BRPM | DIASTOLIC BLOOD PRESSURE: 72 MMHG | HEIGHT: 66 IN | HEART RATE: 89 BPM

## 2024-02-09 PROCEDURE — G2211 COMPLEX E/M VISIT ADD ON: CPT

## 2024-02-09 PROCEDURE — 99214 OFFICE O/P EST MOD 30 MIN: CPT

## 2024-02-09 NOTE — ASSESSMENT
[FreeTextEntry1] : Fibromyalgia  Off Lyrica- reports getting very drowsy Off tizanidine- no longer helping  - encouraged proper diet, good sleep hygiene, exercise - c/w nortriptyline 100 mg daily - c/w methocarbamol 500mg daily- TID as tolerated - c/w gabapentin to 300mg in AM, 600 mg in PM - meloxicam 15mg daily - c/w pain management f.u - repeat labs   Discussed treatment plan with the patient. The patient was given the opportunity to ask questions and all questions were answered to their satisfaction.

## 2024-02-09 NOTE — PHYSICAL EXAM
[General Appearance - Alert] : alert [General Appearance - In No Acute Distress] : in no acute distress [General Appearance - Well Nourished] : well nourished [General Appearance - Well Developed] : well developed [PERRL With Normal Accommodation] : pupils were equal in size, round, and reactive to light [Sclera] : the sclera and conjunctiva were normal [Extraocular Movements] : extraocular movements were intact [Outer Ear] : the ears and nose were normal in appearance [Neck Appearance] : the appearance of the neck was normal [Jugular Venous Distention Increased] : there was no jugular-venous distention [Auscultation Breath Sounds / Voice Sounds] : lungs were clear to auscultation bilaterally [Apical Impulse] : the apical impulse was normal [Heart Rate And Rhythm] : heart rate was normal and rhythm regular [Heart Sounds] : normal S1 and S2 [Bowel Sounds] : normal bowel sounds [Abdomen Soft] : soft [Abdomen Tenderness] : non-tender [Abdomen Mass (___ Cm)] : no abdominal mass palpated [No CVA Tenderness] : no ~M costovertebral angle tenderness [No Spinal Tenderness] : no spinal tenderness [Nail Clubbing] : no clubbing  or cyanosis of the fingernails [Motor Tone] : muscle strength and tone were normal [] : no rash [Skin Lesions] : no skin lesions [Sensation] : the sensory exam was normal to light touch and pinprick [Motor Exam] : the motor exam was normal [No Focal Deficits] : no focal deficits [Oriented To Time, Place, And Person] : oriented to person, place, and time [FreeTextEntry1] : +multiple tender points

## 2024-02-09 NOTE — HISTORY OF PRESENT ILLNESS
[FreeTextEntry1] : Pt presenting today for a f.u visit for fibromyalgia. Last seen 09/2023. Chart reviewed since LV.  No acute complaints today.  On gabapentin 300mg in AM, 600 mg in PM. Also taking nortriptyline 100 mg daily, methocarbamol 500mg daily- TID. Also takes NSAIDS, Tylenol prn for pain. Tolerating medication well. No side effects.  Following pain management. s/p trigger point injections, myofascial release with improvement in pain.  denies fever, chills, denies chest pain, chest palpitations, denies sob, denies nausea, vomiting, abdominal pain, denies rashes.  Recent labs from PCP reviewed.

## 2024-02-22 ENCOUNTER — TRANSCRIPTION ENCOUNTER (OUTPATIENT)
Age: 49
End: 2024-02-22

## 2024-03-14 ENCOUNTER — TRANSCRIPTION ENCOUNTER (OUTPATIENT)
Age: 49
End: 2024-03-14

## 2024-03-19 ENCOUNTER — TRANSCRIPTION ENCOUNTER (OUTPATIENT)
Age: 49
End: 2024-03-19

## 2024-03-30 ENCOUNTER — OUTPATIENT (OUTPATIENT)
Dept: OUTPATIENT SERVICES | Facility: HOSPITAL | Age: 49
LOS: 1 days | End: 2024-03-30
Payer: MEDICARE

## 2024-03-30 ENCOUNTER — APPOINTMENT (OUTPATIENT)
Dept: CT IMAGING | Facility: CLINIC | Age: 49
End: 2024-03-30

## 2024-03-30 DIAGNOSIS — M89.9 DISORDER OF BONE, UNSPECIFIED: ICD-10-CM

## 2024-03-30 PROCEDURE — 70450 CT HEAD/BRAIN W/O DYE: CPT | Mod: 26

## 2024-04-02 ENCOUNTER — APPOINTMENT (OUTPATIENT)
Dept: NEUROSURGERY | Facility: CLINIC | Age: 49
End: 2024-04-02
Payer: MEDICARE

## 2024-04-02 VITALS
WEIGHT: 125 LBS | OXYGEN SATURATION: 99 % | HEART RATE: 99 BPM | SYSTOLIC BLOOD PRESSURE: 121 MMHG | DIASTOLIC BLOOD PRESSURE: 86 MMHG | BODY MASS INDEX: 20.09 KG/M2 | TEMPERATURE: 99 F | HEIGHT: 66 IN

## 2024-04-02 PROCEDURE — 99202 OFFICE O/P NEW SF 15 MIN: CPT

## 2024-04-02 NOTE — PHYSICAL EXAM
[General Appearance - Alert] : alert [General Appearance - In No Acute Distress] : in no acute distress [Oriented To Time, Place, And Person] : oriented to person, place, and time [Cranial Nerves Optic (II)] : visual acuity intact bilaterally,  pupils equal round and reactive to light [Cranial Nerves Trigeminal (V)] : facial sensation intact symmetrically [Cranial Nerves Oculomotor (III)] : extraocular motion intact [Cranial Nerves Facial (VII)] : face symmetrical [Cranial Nerves Vestibulocochlear (VIII)] : hearing was intact bilaterally [Cranial Nerves Accessory (XI - Cranial And Spinal)] : head turning and shoulder shrug symmetric [Cranial Nerves Glossopharyngeal (IX)] : tongue and palate midline [Motor Tone] : muscle tone was normal in all four extremities

## 2024-04-02 NOTE — HISTORY OF PRESENT ILLNESS
[FreeTextEntry1] : head pain, skull lesion [de-identified] : Ms. Dacia Barron (Chrissy) is a very pleasant 48 year old female with a history of TBI, chronic neck pain who presents with pain, pressure of her right scalp.  She reports that in 2016 she had undergone an MRI which demonstrated a right-sided skull lesion incidentally.  She reports that in the past year or so she feels like the right skull bump is getting larger.  She worked reports pain and pressure around the bump.  She also notes some numbness in the area.  She has tried ibuprofen and Tylenol which has not helped.  Pain is rated 4 out of 10.   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

## 2024-04-02 NOTE — ASSESSMENT
Patient resting in bed call light within reach. Family at bedside. Respirations even and unlabored on room air. Oriented patient and family to floor and call light system. Will continue to monitor. [FreeTextEntry1] : Ms. Dacia Barron (Chrissy) is a very pleasant 48 year old female with a history of TBI, chronic neck pain who presents with pain, pressure, numbneess of her right scalp at the area of a known skull lesion.  I reviewed her imaging with her and explained that the skull lesion has largely been unchanged since 2016.  I also explained that her symptoms seem a little bit atypical however I will see her back in 1 month and if she has persistent symptoms then we can discuss surgical resection of the skull lesion.  I preliminarily explained the details of surgery and that she may still have some of her symptoms afterwards.  All questions answered.  She knows, office with any issues or concerns.

## 2024-04-02 NOTE — END OF VISIT
From: Baltazar Gutiérrez  To: Horacio Pal MD  Sent: 2020 7:42 AM CST  Subject: Medication Question    My script for Lantau has  and I am traveling out of [place]. Could you please refill my Lantau as an emergency backup. I will be departing on a cruise ship on [place] morning and want to make sure I have that safety net.     Thank you  
[Time Spent: ___ minutes] : I have spent [unfilled] minutes of time on the encounter.

## 2024-04-18 ENCOUNTER — TRANSCRIPTION ENCOUNTER (OUTPATIENT)
Age: 49
End: 2024-04-18

## 2024-05-03 ENCOUNTER — APPOINTMENT (OUTPATIENT)
Dept: NEUROSURGERY | Facility: CLINIC | Age: 49
End: 2024-05-03
Payer: MEDICARE

## 2024-05-03 VITALS
SYSTOLIC BLOOD PRESSURE: 114 MMHG | TEMPERATURE: 99.1 F | HEIGHT: 66 IN | DIASTOLIC BLOOD PRESSURE: 69 MMHG | OXYGEN SATURATION: 97 % | BODY MASS INDEX: 20.09 KG/M2 | WEIGHT: 125 LBS | HEART RATE: 87 BPM

## 2024-05-03 PROCEDURE — 99213 OFFICE O/P EST LOW 20 MIN: CPT

## 2024-05-07 NOTE — ASSESSMENT
[FreeTextEntry1] : Ms. Dacia Barron (Chrissy) is a very pleasant 48 year old female with a history of TBI, chronic neck pain who presents with persistent and worsening pain, pressure, numbness of her right scalp at the area of a known skull lesion.  I again reviewed her imaging with her and explained that her symptoms are bit atypical.  I discussed with her treatment options including continued observation versus surgery in the form of craniectomy for skull lesion resection.  I discussed with her the details of surgery as well is the potential risks and benefits.  Specifically I discussed with her that she could still have persistent symptoms after surgery.  Additionally there is a risk of bleeding, infection, neurological injury, stroke, coma, death with surgery. At this time she is interested in surgery so I will work to schedule her.  All questions answered.  She knows to call my office with any issues or concerns.

## 2024-05-13 ENCOUNTER — APPOINTMENT (OUTPATIENT)
Dept: RHEUMATOLOGY | Facility: CLINIC | Age: 49
End: 2024-05-13
Payer: MEDICARE

## 2024-05-13 DIAGNOSIS — R20.0 ANESTHESIA OF SKIN: ICD-10-CM

## 2024-05-13 DIAGNOSIS — R20.2 ANESTHESIA OF SKIN: ICD-10-CM

## 2024-05-13 DIAGNOSIS — M54.2 CERVICALGIA: ICD-10-CM

## 2024-05-13 DIAGNOSIS — M25.50 PAIN IN UNSPECIFIED JOINT: ICD-10-CM

## 2024-05-13 DIAGNOSIS — M50.30 OTHER CERVICAL DISC DEGENERATION, UNSPECIFIED CERVICAL REGION: ICD-10-CM

## 2024-05-13 DIAGNOSIS — M79.18 MYALGIA, OTHER SITE: ICD-10-CM

## 2024-05-13 DIAGNOSIS — R53.82 CHRONIC FATIGUE, UNSPECIFIED: ICD-10-CM

## 2024-05-13 DIAGNOSIS — M54.12 RADICULOPATHY, CERVICAL REGION: ICD-10-CM

## 2024-05-13 DIAGNOSIS — M79.10 MYALGIA, UNSPECIFIED SITE: ICD-10-CM

## 2024-05-13 DIAGNOSIS — M79.7 FIBROMYALGIA: ICD-10-CM

## 2024-05-13 PROCEDURE — G2211 COMPLEX E/M VISIT ADD ON: CPT

## 2024-05-13 PROCEDURE — 99214 OFFICE O/P EST MOD 30 MIN: CPT

## 2024-05-13 RX ORDER — METHOCARBAMOL 500 MG/1
500 TABLET, FILM COATED ORAL
Qty: 60 | Refills: 1 | Status: ACTIVE | COMMUNITY
Start: 2024-02-16 | End: 1900-01-01

## 2024-05-13 RX ORDER — MELOXICAM 15 MG/1
15 TABLET ORAL DAILY
Qty: 30 | Refills: 3 | Status: ACTIVE | COMMUNITY
Start: 2022-01-24 | End: 1900-01-01

## 2024-05-13 RX ORDER — GABAPENTIN 300 MG/1
300 CAPSULE ORAL
Qty: 90 | Refills: 2 | Status: ACTIVE | COMMUNITY
Start: 2021-07-26 | End: 1900-01-01

## 2024-05-13 NOTE — HISTORY OF PRESENT ILLNESS
[Home] : at home, [unfilled] , at the time of the visit. [place] [place] at the medical office located [place]  [Verbal consent obtained from patient] : the patient, [unfilled] [FreeTextEntry1] : Pt presenting today for a f.u visit for fibromyalgia. Last seen 02/2024. Chart reviewed since LV.  No acute complaints today.  On gabapentin 300mg in AM, 600 mg in PM. Also taking nortriptyline 100 mg daily, methocarbamol 500mg daily- TID. Also takes NSAIDS, Tylenol prn for pain. Tolerating medication well. No side effects.  Following pain management. s/p trigger point injections, myofascial release with improvement in pain.  denies fever, chills, denies chest pain, chest palpitations, denies sob, denies nausea, vomiting, abdominal pain, denies rashes.  Recent labs from PCP reviewed.

## 2024-05-15 ENCOUNTER — OUTPATIENT (OUTPATIENT)
Dept: OUTPATIENT SERVICES | Facility: HOSPITAL | Age: 49
LOS: 1 days | End: 2024-05-15
Payer: MEDICARE

## 2024-05-15 VITALS
WEIGHT: 127.87 LBS | DIASTOLIC BLOOD PRESSURE: 74 MMHG | SYSTOLIC BLOOD PRESSURE: 115 MMHG | RESPIRATION RATE: 12 BRPM | HEART RATE: 86 BPM | HEIGHT: 66 IN | TEMPERATURE: 98 F | OXYGEN SATURATION: 97 %

## 2024-05-15 DIAGNOSIS — Z01.818 ENCOUNTER FOR OTHER PREPROCEDURAL EXAMINATION: ICD-10-CM

## 2024-05-15 DIAGNOSIS — Z98.890 OTHER SPECIFIED POSTPROCEDURAL STATES: Chronic | ICD-10-CM

## 2024-05-15 DIAGNOSIS — Z87.898 PERSONAL HISTORY OF OTHER SPECIFIED CONDITIONS: ICD-10-CM

## 2024-05-15 DIAGNOSIS — Z91.89 OTHER SPECIFIED PERSONAL RISK FACTORS, NOT ELSEWHERE CLASSIFIED: ICD-10-CM

## 2024-05-15 DIAGNOSIS — Z29.9 ENCOUNTER FOR PROPHYLACTIC MEASURES, UNSPECIFIED: ICD-10-CM

## 2024-05-15 DIAGNOSIS — D16.4 BENIGN NEOPLASM OF BONES OF SKULL AND FACE: ICD-10-CM

## 2024-05-15 DIAGNOSIS — D64.9 ANEMIA, UNSPECIFIED: ICD-10-CM

## 2024-05-15 LAB
A1C WITH ESTIMATED AVERAGE GLUCOSE RESULT: 4.9 % — SIGNIFICANT CHANGE UP (ref 4–5.6)
ALBUMIN SERPL ELPH-MCNC: 4.2 G/DL — SIGNIFICANT CHANGE UP (ref 3.3–5.2)
ALP SERPL-CCNC: 57 U/L — SIGNIFICANT CHANGE UP (ref 40–120)
ALT FLD-CCNC: 34 U/L — HIGH
ANION GAP SERPL CALC-SCNC: 11 MMOL/L — SIGNIFICANT CHANGE UP (ref 5–17)
APTT BLD: 31 SEC — SIGNIFICANT CHANGE UP (ref 24.5–35.6)
AST SERPL-CCNC: 21 U/L — SIGNIFICANT CHANGE UP
BILIRUB SERPL-MCNC: 0.3 MG/DL — LOW (ref 0.4–2)
BLD GP AB SCN SERPL QL: SIGNIFICANT CHANGE UP
BUN SERPL-MCNC: 10.5 MG/DL — SIGNIFICANT CHANGE UP (ref 8–20)
CALCIUM SERPL-MCNC: 9.1 MG/DL — SIGNIFICANT CHANGE UP (ref 8.4–10.5)
CHLORIDE SERPL-SCNC: 97 MMOL/L — SIGNIFICANT CHANGE UP (ref 96–108)
CO2 SERPL-SCNC: 28 MMOL/L — SIGNIFICANT CHANGE UP (ref 22–29)
CREAT SERPL-MCNC: 0.72 MG/DL — SIGNIFICANT CHANGE UP (ref 0.5–1.3)
EGFR: 104 ML/MIN/1.73M2 — SIGNIFICANT CHANGE UP
ESTIMATED AVERAGE GLUCOSE: 94 MG/DL — SIGNIFICANT CHANGE UP (ref 68–114)
GLUCOSE SERPL-MCNC: 94 MG/DL — SIGNIFICANT CHANGE UP (ref 70–99)
HCG SERPL-ACNC: <4 MIU/ML — SIGNIFICANT CHANGE UP
INR BLD: 0.97 RATIO — SIGNIFICANT CHANGE UP (ref 0.85–1.18)
MRSA PCR RESULT.: SIGNIFICANT CHANGE UP
POTASSIUM SERPL-MCNC: 4.5 MMOL/L — SIGNIFICANT CHANGE UP (ref 3.5–5.3)
POTASSIUM SERPL-SCNC: 4.5 MMOL/L — SIGNIFICANT CHANGE UP (ref 3.5–5.3)
PROT SERPL-MCNC: 6.6 G/DL — SIGNIFICANT CHANGE UP (ref 6.6–8.7)
PROTHROM AB SERPL-ACNC: 10.8 SEC — SIGNIFICANT CHANGE UP (ref 9.5–13)
S AUREUS DNA NOSE QL NAA+PROBE: DETECTED
SODIUM SERPL-SCNC: 135 MMOL/L — SIGNIFICANT CHANGE UP (ref 135–145)

## 2024-05-15 PROCEDURE — 86850 RBC ANTIBODY SCREEN: CPT

## 2024-05-15 PROCEDURE — 87640 STAPH A DNA AMP PROBE: CPT

## 2024-05-15 PROCEDURE — 80053 COMPREHEN METABOLIC PANEL: CPT

## 2024-05-15 PROCEDURE — 71046 X-RAY EXAM CHEST 2 VIEWS: CPT

## 2024-05-15 PROCEDURE — 84702 CHORIONIC GONADOTROPIN TEST: CPT

## 2024-05-15 PROCEDURE — 85730 THROMBOPLASTIN TIME PARTIAL: CPT

## 2024-05-15 PROCEDURE — 86901 BLOOD TYPING SEROLOGIC RH(D): CPT

## 2024-05-15 PROCEDURE — 71046 X-RAY EXAM CHEST 2 VIEWS: CPT | Mod: 26

## 2024-05-15 PROCEDURE — G0463: CPT

## 2024-05-15 PROCEDURE — 85610 PROTHROMBIN TIME: CPT

## 2024-05-15 PROCEDURE — 93005 ELECTROCARDIOGRAM TRACING: CPT

## 2024-05-15 PROCEDURE — 83036 HEMOGLOBIN GLYCOSYLATED A1C: CPT

## 2024-05-15 PROCEDURE — 36415 COLL VENOUS BLD VENIPUNCTURE: CPT

## 2024-05-15 PROCEDURE — 87641 MR-STAPH DNA AMP PROBE: CPT

## 2024-05-15 PROCEDURE — 93010 ELECTROCARDIOGRAM REPORT: CPT

## 2024-05-15 PROCEDURE — 86900 BLOOD TYPING SEROLOGIC ABO: CPT

## 2024-05-15 RX ORDER — SERTRALINE 25 MG/1
1 TABLET, FILM COATED ORAL
Qty: 0 | Refills: 0 | DISCHARGE

## 2024-05-15 RX ORDER — LAMOTRIGINE 25 MG/1
1 TABLET, ORALLY DISINTEGRATING ORAL
Qty: 0 | Refills: 0 | DISCHARGE

## 2024-05-15 RX ORDER — MUPIROCIN 20 MG/G
1 OINTMENT TOPICAL
Qty: 1 | Refills: 0
Start: 2024-05-15 | End: 2024-05-19

## 2024-05-15 NOTE — H&P PST ADULT - OTHER CARE PROVIDERS
PCP Dr. Latrell De Paz [phone], Neurologist Dr. Cadena, [facility]. PCP Dr. Latrell De Paz [phone], Neurologist Dr. Cadena [phone], [facility].

## 2024-05-15 NOTE — H&P PST ADULT - NEGATIVE MUSCULOSKELETAL SYMPTOMS
no arthralgia/no arthritis/no joint swelling/no myalgia/no muscle cramps/no muscle weakness/no stiffness/no arm pain L/no arm pain R

## 2024-05-15 NOTE — H&P PST ADULT - PROBLEM SELECTOR PLAN 3
Medical clearance pending. PCP for medical management and follow up as indicated. Medical and neuro. optimization pending. PCP/neuro. follow up and management as indicated.

## 2024-05-15 NOTE — H&P PST ADULT - PROBLEM SELECTOR PLAN 2
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 Maeve Samson, Medical and neurology optimization pending for  right craniectomy for skull bone lesion on 5/20/24 with Dr. Maeve Samson,
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
## 2024-05-15 NOTE — H&P PST ADULT - PROBLEM SELECTOR PLAN 1
Caprini Score, at risk, surgical team to order appropriate VTE prophylaxis Caprini Score=3 surgical team to order appropriate VTE prophylaxis

## 2024-05-15 NOTE — H&P PST ADULT - NEGATIVE NEUROLOGICAL SYMPTOMS
no transient paralysis/no weakness/no paresthesias/no syncope/no tremors/no vertigo/no loss of sensation/no difficulty walking/no loss of consciousness/no hemiparesis/no confusion/no facial palsy

## 2024-05-15 NOTE — H&P PST ADULT - NS PRO FEM REPRO HEALTH SCREEN
1   F/u with Peds Dr  In 3-5 days        Conjunctivitis   WHAT YOU SHOULD KNOW:   Conjunctivitis, or pink eye, is inflammation of your conjunctiva  The conjunctiva is a thin tissue that covers the front of your eye and the back of your eyelids  The conjunctiva helps protect your eye and keep it moist         INSTRUCTIONS:   Medicines:   · Allergy medicine: This medicine helps decrease itchy, red, swollen eyes caused by allergies  It may be given as a pill, eye drops, or nasal spray  · Antibiotics:  You will need antibiotics if your conjunctivitis is caused by bacteria  This medicine may be given as eye drops or eye ointment  · Steroid medicine: This medicine helps decrease inflammation  It may be given as a pill, eye drops, or nasal spray  · Take your medicine as directed  Call your healthcare provider if you think your medicine is not helping or if you have side effects  Tell him if you are allergic to any medicine  Keep a list of the medicines, vitamins, and herbs you take  Include the amounts, and when and why you take them  Bring the list or the pill bottles to follow-up visits  Carry your medicine list with you in case of an emergency  Follow up with your primary healthcare provider as directed: You may need to return for more tests on your eyes  These will help your primary healthcare provider check for eye damage  Write down your questions so you remember to ask them during your visits  Avoid the spread of conjunctivitis:   · Wash your hands often:  Wash your hands before you touch your eyes  Also wash your hands before you prepare or eat food and after you use the bathroom or change a diaper  · Avoid allergens:  Try to avoid the things that cause your allergies, such as pets, dust, or grass  · Avoid contact:  Do not share towels or washcloths  Try to stay away from others as much as possible  Ask when you can return to work or school       · Throw away eye makeup:  Throw away lamont and other eye makeup  Manage your symptoms:  · Apply a cool compress:  Wet a washcloth with cold water and place it on your eye  This will help decrease swelling  · Use eye drops:  Eye drops, or artificial tears, can be bought without a doctor's order  They help keep your eye moist     · Do not wear contact lenses: They can irritate your eye  Throw away the pair you are using and ask when you can wear them again  Use a new pair of lenses when your primary healthcare provider says it is okay  · Flush your eye:  You may need to flush your eye with saline to help decrease your symptoms  Ask for more information on how to flush your eye  Contact your primary healthcare provider if:   · Your eyesight becomes blurry  · You have tiny bumps or spots of blood on your eye  · You have questions or concerns about your condition or care  Return to the emergency department if:   · The swelling in your eye gets worse, even after treatment  · Your vision suddenly becomes worse or you cannot see at all  · Your eye begins to bleed  © 2014 [address] is for End User's use only and may not be sold, redistributed or otherwise used for commercial purposes  All illustrations and images included in CareNotes® are the copyrighted property of [employer]  or [employer]  The above information is an  only  It is not intended as medical advice for individual conditions or treatments  Talk to your doctor, nurse or pharmacist before following any medical regimen to see if it is safe and effective for you  mammogram/breast self-exam

## 2024-05-15 NOTE — H&P PST ADULT - NSICDXFAMILYHX_GEN_ALL_CORE_FT
FAMILY HISTORY:  FH: pancreatic cancer    Father  Still living? Unknown  FH: prostate cancer, Age at diagnosis: Age Unknown    Grandparent  Still living? Unknown  FH: breast cancer, Age at diagnosis: Age Unknown  FH: prostate cancer, Age at diagnosis: Age Unknown    Aunt  Still living? Unknown  FH: colon cancer, Age at diagnosis: Age Unknown

## 2024-05-15 NOTE — H&P PST ADULT - GASTROINTESTINAL
normal/soft/nontender/nondistended/normal active bowel sounds/no guarding/no rigidity/no organomegaly/no palpable perez/no masses palpable details…

## 2024-05-15 NOTE — CHART NOTE - NSCHARTNOTEFT_GEN_A_CORE
Search Terms: bety hidalgo, 1975Search Date: 05/15/2024 09:14:06 AM  The Drug Utilization Report below displays all of the controlled substance prescriptions, if any, that your patient has filled in the last twelve months. The information displayed on this report is compiled from pharmacy submissions to the Department, and accurately reflects the information as submitted by the pharmacies.    This report was requested by: Diane Green | Reference #: [id number]    You have not added a KAUSHIK number. Keeping your KAUSHIK number(s) up to date on the My KAUSHIK # tab will enable the separation of your prescriptions from others in the search results.    Practitioner Count: 1  Pharmacy Count: 2  Current Opioid Prescriptions: 0  Current Benzodiazepine Prescriptions: 0  Current Stimulant Prescriptions: 1      Patient Demographic Information (PDI)       PDI	First Name	Last Name	Birth Date	Gender	Street Address	City	State	Zip Code  A	Bety Hidalgo	1975	Female	[address] [facility]	[address]  B	Bety Hidalgo	1975	Female	[address] [facility]	[address]    Prescription Information      PDI Filter:    PDI	Current Rx	Drug Type	Rx Written	Rx Dispensed	Drug	Quantity	Days Supply	Prescriber Name	Prescriber KAUSHIK #	Payment Method	Dispenser  A	N	S	02/15/2024	02/18/2024	dextroamp-amphetamin 20 mg tab	90	30	Suozzo, June Casarez NP	[id number]	Medicare	[id number]  A	N	S	01/13/2024	01/18/2024	dextroamp-amphetamin 20 mg tab	90	30	Suozzo, June Casarez NP	[id number]	Medicare	[id number]  A	N	S	12/11/2023	12/16/2023	dextroamp-amphetamin 20 mg tab	60	30	Suozzo, June Casarez NP	[id number]	Medicare	[id number]  A	N	S	12/12/2023	12/12/2023	dextroamp-amphet er 20 mg cap	30	30	Suozzo, June Casarez NP	[id number]	Medicare	[id number]  A	N	S	09/11/2023	09/11/2023	dextroamp-amphet er 20 mg cap	30	30	Suozzo, June Casarez NP	[id number]	Medicare	[id number]  A	N	S	08/07/2023	08/21/2023	dextroamp-amphetamin 20 mg tab	60	30	Suozzo, June Casarez NP	[id number]	Medicare	[id number]  A	N	S	07/20/2023	08/12/2023	dextroamp-amphet er 20 mg cap	30	30	Suozzo, June Casarez NP	[id number]	Medicare	[id number]  A	N	S	07/19/2023	07/22/2023	dextroamp-amphetamin 20 mg tab	60	30	Suozzo, June Casarez NP	[id number]	Medicare	[id number]  A	N	S	07/06/2023	07/08/2023	dextroamp-amphet er 20 mg cap	60	30	Suozzo, June Casarez NP	[id number]	[id number]  A	N	S	06/19/2023	06/21/2023	dextroamp-amphetamin 20 mg tab	30	30	Suozzo, June Casarez NP	[id number]	Medicare	[facility][id number]  A	N	S	06/20/2023	06/21/2023	dextroamp-amphet er 20 mg cap	30	15	Suozzo, June Casarez NP	[id number]	Medicare	[id number]  A	N	S	06/06/2023	06/06/2023	dextroamp-amphet er 20 mg cap	30	30	Suozzo, June Casarez NP	[id number]	Medicare	[id number]  A	N	S	05/22/2023	05/23/2023	dextroamp-amphetamin 20 mg tab	30	30	Suozzo, June Casarez NP	[id number]	Medicare	[facility][id number]  A	N	S	05/19/2023	05/22/2023	dextroamp-amphet er 20 mg cap	30	15	Suozzo, June Casarez NP	[id number]	Medicare	[id number]  B	Y	S	04/22/2024	04/22/2024	dextroamp-amphetamin 20 mg tab	90	30	Suozzo, June Casarez NP	[id number]	Medicare	[id number]  B	N	S	03/23/2024	03/25/2024	dextroamp-amphetamin 20 mg tab	90	30	Suozzo, June Casarez NP	[id number]	Medicare	[id number]  B	N	S	10/24/2023	11/22/2023	dextroamp-amphet er 20 mg cap	60	30	Suozzo, June Casarez NP	[id number]	Medicare	[id number]  B	N	S	11/14/2023	11/15/2023	dextroamp-amphetamin 20 mg tab	30	30	Suozzo, June Casarez NP	[id number]	Medicare	[facility][id number]  B	N	S	10/18/2023	10/25/2023	dextroamp-amphet er 20 mg cap	60	30	Suozzo, June Casarez NP	[id number]	Medicare	[facility][id number]  B	N	S	10/12/2023	10/18/2023	dextroamp-amphetamin 20 mg tab	30	30	Suozzo, June Casarez NP	[id number]	Medicare	[id number]  B	N	S	09/19/2023	09/20/2023	dextroamp-amphetamin 20 mg tab	60	30	Suozzo, June Casarez NP	[id number]	Medicare	[id number]    * - Details of Drug Type : O = Opioid, B = Benzodiazepine, S = Stimulant
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
## 2024-05-15 NOTE — H&P PST ADULT - NSANTHOSAYNRD_GEN_A_CORE
No. SINTIA screening performed.  STOP BANG Legend: 0-2 = LOW Risk; 3-4 = INTERMEDIATE Risk; 5-8 = HIGH Risk

## 2024-05-15 NOTE — H&P PST ADULT - NEGATIVE PSYCHIATRIC SYMPTOMS
no suicidal ideation/no anxiety/no insomnia/no paranoia/no agitation/no visual hallucinations/no auditory hallucinations/no hyperactivity

## 2024-05-15 NOTE — H&P PST ADULT - NSICDXPASTMEDICALHX_GEN_ALL_CORE_FT
PAST MEDICAL HISTORY:  Anemia     Anxiety     COVID-19 vaccine series completed      PAST MEDICAL HISTORY:  ADD (attention deficit disorder)     Alcoholism     Anemia     Anxiety     COVID-19 vaccine series completed     Former smoker     H/O traumatic brain injury     History of chronic pain     History of illicit drug use     History of seizure     Major depression     Seasonal allergies

## 2024-05-15 NOTE — H&P PST ADULT - PROBLEM SELECTOR PLAN 4
Medical optimization pending. Surgical team to follow. Pain consult ordered for DOS. PCP for medical management and follow up as indicated.

## 2024-05-15 NOTE — H&P PST ADULT - EKG AND INTERPRETATION
EKG pending final cardiac interpretation EKG NSR 80 bpm pending medical optimization and pending final cardiac interpretation, ekg faxed to PCP, pt. is aware to f/u with PCP re. EKG results.

## 2024-05-15 NOTE — H&P PST ADULT - NSHP PST DIAGOTHER LIST_GEN_A_CORE
5/15/24 13:21 All available labs and diagnostics noted as documented. All abnormal labs and diagnostics noted as documented and have been faxed to PCP with whom medical optimization is pending.  Per surgeon one time dose of povidone OK on DOS given <5 full days out from procedure. CXR pending. Blair JURADO, FNP-BC

## 2024-05-15 NOTE — H&P PST ADULT - NSSUBSTANCEUSE_GEN_ALL_CORE_SD
denies illicit drug use, history of alcohol use in the past, cocaine and marijuana, acid, and ecstasy -  last use was 13 years ago./caffeine

## 2024-05-16 ENCOUNTER — NON-APPOINTMENT (OUTPATIENT)
Age: 49
End: 2024-05-16

## 2024-05-17 ENCOUNTER — APPOINTMENT (OUTPATIENT)
Dept: NEUROSURGERY | Facility: CLINIC | Age: 49
End: 2024-05-17
Payer: MEDICARE

## 2024-05-17 VITALS
BODY MASS INDEX: 20.09 KG/M2 | OXYGEN SATURATION: 99 % | TEMPERATURE: 98.9 F | DIASTOLIC BLOOD PRESSURE: 69 MMHG | SYSTOLIC BLOOD PRESSURE: 110 MMHG | HEIGHT: 66 IN | HEART RATE: 89 BPM | WEIGHT: 125 LBS

## 2024-05-17 PROCEDURE — 99214 OFFICE O/P EST MOD 30 MIN: CPT | Mod: 57

## 2024-05-17 NOTE — ASU PATIENT PROFILE, ADULT - NSICDXPASTMEDICALHX_GEN_ALL_CORE_FT
PAST MEDICAL HISTORY:  ADD (attention deficit disorder)     Alcoholism     Anemia     Anxiety     COVID-19 vaccine series completed     Former smoker     H/O traumatic brain injury     History of chronic pain     History of illicit drug use     History of seizure     Major depression     Seasonal allergies

## 2024-05-19 ENCOUNTER — TRANSCRIPTION ENCOUNTER (OUTPATIENT)
Age: 49
End: 2024-05-19

## 2024-05-19 NOTE — ASSESSMENT
[FreeTextEntry1] : Ms. Dacia Barron (Chrissy) is a very pleasant 48 year old female with a history of TBI, chronic neck pain who presents for follow-up of pain, pressure of her right scalp with a right skull lesion. I reviewed the details of surgery with her, as well as the risks, benefits, and anticipated recovery. The risks of this surgery include, but are not limited to persistent symptoms, pain, bleeding, infection, blood vessel (arterial or venous) injury resulting in a stroke, injury to the brain causing weakness, numbness/tingling, visual changes, seizures, cognition issues, gait issues, regrowth of skull lesion, CSF leak, issues with the hardware, incision issues, numbness of the incision, stroke, coma, death, risks of anesthesia, blood clots of the legs and/or lung, urinary tract infection, pneumonia, need for additional surgery, heart attack, stroke, coma, and death. All questions answered. No guarantees of success were made. With a thorough understanding of our discussion, Ms. Barron wishes to proceed. OR on Monday for right craniectomy for skull lesion resection. Consents signed in the office.

## 2024-05-19 NOTE — HISTORY OF PRESENT ILLNESS
[FreeTextEntry1] : Ms. Dacia Barron (Chrissy) is a very pleasant 48 year old female with a history of TBI, chronic neck pain who presents for follow-up of pain, pressure of her right scalp with a right skull lesion. Her symptoms have persisted and continued to worsen with continued numbness and pressure at the site of the skull lesion. She returns today to discuss surgery. 
10

## 2024-05-20 ENCOUNTER — INPATIENT (INPATIENT)
Facility: HOSPITAL | Age: 49
LOS: 0 days | Discharge: HOME | DRG: 566 | End: 2024-05-21
Attending: STUDENT IN AN ORGANIZED HEALTH CARE EDUCATION/TRAINING PROGRAM | Admitting: STUDENT IN AN ORGANIZED HEALTH CARE EDUCATION/TRAINING PROGRAM
Payer: MEDICARE

## 2024-05-20 ENCOUNTER — APPOINTMENT (OUTPATIENT)
Dept: NEUROSURGERY | Facility: HOSPITAL | Age: 49
End: 2024-05-20

## 2024-05-20 VITALS
RESPIRATION RATE: 16 BRPM | HEART RATE: 87 BPM | DIASTOLIC BLOOD PRESSURE: 70 MMHG | OXYGEN SATURATION: 100 % | HEIGHT: 55 IN | TEMPERATURE: 98 F | WEIGHT: 127.87 LBS | SYSTOLIC BLOOD PRESSURE: 100 MMHG

## 2024-05-20 DIAGNOSIS — Z98.890 OTHER SPECIFIED POSTPROCEDURAL STATES: Chronic | ICD-10-CM

## 2024-05-20 DIAGNOSIS — D16.4 BENIGN NEOPLASM OF BONES OF SKULL AND FACE: ICD-10-CM

## 2024-05-20 LAB — ABO RH CONFIRMATION: SIGNIFICANT CHANGE UP

## 2024-05-20 PROCEDURE — 88307 TISSUE EXAM BY PATHOLOGIST: CPT | Mod: 26

## 2024-05-20 PROCEDURE — 70450 CT HEAD/BRAIN W/O DYE: CPT | Mod: 26

## 2024-05-20 PROCEDURE — 61500 CRNEC EXC TUM/BONE LES SKULL: CPT

## 2024-05-20 PROCEDURE — 61500 CRNEC EXC TUM/BONE LES SKULL: CPT | Mod: AS

## 2024-05-20 PROCEDURE — 88311 DECALCIFY TISSUE: CPT | Mod: 26

## 2024-05-20 DEVICE — SCREW UN3 AXS SELF DRILL 1.5X4MM: Type: IMPLANTABLE DEVICE | Site: RIGHT | Status: FUNCTIONAL

## 2024-05-20 DEVICE — PLATE COVER BURRHOLE UN3 W/ TAB 20MM: Type: IMPLANTABLE DEVICE | Site: RIGHT | Status: FUNCTIONAL

## 2024-05-20 DEVICE — SURGIFOAM PAD 8CM X 12.5CM X 10MM (100): Type: IMPLANTABLE DEVICE | Site: RIGHT | Status: FUNCTIONAL

## 2024-05-20 DEVICE — FLOSEAL WITH RECOTHROM THROMBIN 10ML: Type: IMPLANTABLE DEVICE | Site: RIGHT | Status: FUNCTIONAL

## 2024-05-20 RX ORDER — CEFAZOLIN SODIUM 1 G
2000 VIAL (EA) INJECTION EVERY 8 HOURS
Refills: 0 | Status: DISCONTINUED | OUTPATIENT
Start: 2024-05-20 | End: 2024-05-20

## 2024-05-20 RX ORDER — HYDROMORPHONE HYDROCHLORIDE 2 MG/ML
0.5 INJECTION INTRAMUSCULAR; INTRAVENOUS; SUBCUTANEOUS
Refills: 0 | Status: DISCONTINUED | OUTPATIENT
Start: 2024-05-20 | End: 2024-05-20

## 2024-05-20 RX ORDER — OXYCODONE HYDROCHLORIDE 5 MG/1
10 TABLET ORAL EVERY 6 HOURS
Refills: 0 | Status: DISCONTINUED | OUTPATIENT
Start: 2024-05-20 | End: 2024-05-21

## 2024-05-20 RX ORDER — POVIDONE-IODINE 5 %
1 AEROSOL (ML) TOPICAL ONCE
Refills: 0 | Status: COMPLETED | OUTPATIENT
Start: 2024-05-20 | End: 2024-05-20

## 2024-05-20 RX ORDER — NORTRIPTYLINE HYDROCHLORIDE 10 MG/1
100 CAPSULE ORAL AT BEDTIME
Refills: 0 | Status: DISCONTINUED | OUTPATIENT
Start: 2024-05-20 | End: 2024-05-21

## 2024-05-20 RX ORDER — METHOCARBAMOL 500 MG/1
500 TABLET, FILM COATED ORAL
Refills: 0 | Status: DISCONTINUED | OUTPATIENT
Start: 2024-05-20 | End: 2024-05-21

## 2024-05-20 RX ORDER — MONTELUKAST 4 MG/1
1 TABLET, CHEWABLE ORAL
Refills: 0 | DISCHARGE

## 2024-05-20 RX ORDER — GABAPENTIN 400 MG/1
1 CAPSULE ORAL
Refills: 0 | DISCHARGE

## 2024-05-20 RX ORDER — OXYCODONE HYDROCHLORIDE 5 MG/1
5 TABLET ORAL EVERY 6 HOURS
Refills: 0 | Status: DISCONTINUED | OUTPATIENT
Start: 2024-05-20 | End: 2024-05-21

## 2024-05-20 RX ORDER — POLYETHYLENE GLYCOL 3350 17 G/17G
17 POWDER, FOR SOLUTION ORAL DAILY
Refills: 0 | Status: DISCONTINUED | OUTPATIENT
Start: 2024-05-20 | End: 2024-05-21

## 2024-05-20 RX ORDER — HYDRALAZINE HCL 50 MG
10 TABLET ORAL
Refills: 0 | Status: DISCONTINUED | OUTPATIENT
Start: 2024-05-20 | End: 2024-05-21

## 2024-05-20 RX ORDER — CEFAZOLIN SODIUM 1 G
2000 VIAL (EA) INJECTION ONCE
Refills: 0 | Status: DISCONTINUED | OUTPATIENT
Start: 2024-05-20 | End: 2024-05-20

## 2024-05-20 RX ORDER — FENTANYL CITRATE 50 UG/ML
25 INJECTION INTRAVENOUS
Refills: 0 | Status: DISCONTINUED | OUTPATIENT
Start: 2024-05-20 | End: 2024-05-20

## 2024-05-20 RX ORDER — SODIUM CHLORIDE 9 MG/ML
3 INJECTION INTRAMUSCULAR; INTRAVENOUS; SUBCUTANEOUS EVERY 8 HOURS
Refills: 0 | Status: DISCONTINUED | OUTPATIENT
Start: 2024-05-20 | End: 2024-05-20

## 2024-05-20 RX ORDER — MONTELUKAST 4 MG/1
10 TABLET, CHEWABLE ORAL DAILY
Refills: 0 | Status: DISCONTINUED | OUTPATIENT
Start: 2024-05-20 | End: 2024-05-21

## 2024-05-20 RX ORDER — MELOXICAM 15 MG/1
1 TABLET ORAL
Refills: 0 | DISCHARGE

## 2024-05-20 RX ORDER — OXYCODONE AND ACETAMINOPHEN 5; 325 MG/1; MG/1
1 TABLET ORAL EVERY 6 HOURS
Refills: 0 | Status: DISCONTINUED | OUTPATIENT
Start: 2024-05-20 | End: 2024-05-21

## 2024-05-20 RX ORDER — LURASIDONE HYDROCHLORIDE 40 MG/1
1 TABLET ORAL
Refills: 0 | DISCHARGE

## 2024-05-20 RX ORDER — LURASIDONE HYDROCHLORIDE 40 MG/1
40 TABLET ORAL AT BEDTIME
Refills: 0 | Status: DISCONTINUED | OUTPATIENT
Start: 2024-05-20 | End: 2024-05-21

## 2024-05-20 RX ORDER — ACETAMINOPHEN 500 MG
1000 TABLET ORAL ONCE
Refills: 0 | Status: COMPLETED | OUTPATIENT
Start: 2024-05-20 | End: 2024-05-20

## 2024-05-20 RX ORDER — DEXTROAMPHETAMINE SACCHARATE, AMPHETAMINE ASPARTATE, DEXTROAMPHETAMINE SULFATE AND AMPHETAMINE SULFATE 1.875; 1.875; 1.875; 1.875 MG/1; MG/1; MG/1; MG/1
1 TABLET ORAL
Refills: 0 | DISCHARGE

## 2024-05-20 RX ORDER — FEXOFENADINE HCL 30 MG
1 TABLET ORAL
Refills: 0 | DISCHARGE

## 2024-05-20 RX ORDER — METHOCARBAMOL 500 MG/1
2 TABLET, FILM COATED ORAL
Refills: 0 | DISCHARGE

## 2024-05-20 RX ORDER — SENNA PLUS 8.6 MG/1
2 TABLET ORAL AT BEDTIME
Refills: 0 | Status: DISCONTINUED | OUTPATIENT
Start: 2024-05-20 | End: 2024-05-21

## 2024-05-20 RX ORDER — GABAPENTIN 400 MG/1
300 CAPSULE ORAL AT BEDTIME
Refills: 0 | Status: DISCONTINUED | OUTPATIENT
Start: 2024-05-20 | End: 2024-05-21

## 2024-05-20 RX ORDER — NORTRIPTYLINE HYDROCHLORIDE 10 MG/1
2 CAPSULE ORAL
Refills: 0 | DISCHARGE

## 2024-05-20 RX ORDER — LAMOTRIGINE 25 MG/1
1 TABLET, ORALLY DISINTEGRATING ORAL
Refills: 0 | DISCHARGE

## 2024-05-20 RX ORDER — ACETAMINOPHEN 500 MG
975 TABLET ORAL ONCE
Refills: 0 | Status: COMPLETED | OUTPATIENT
Start: 2024-05-20 | End: 2024-05-20

## 2024-05-20 RX ORDER — LAMOTRIGINE 25 MG/1
200 TABLET, ORALLY DISINTEGRATING ORAL
Refills: 0 | Status: DISCONTINUED | OUTPATIENT
Start: 2024-05-20 | End: 2024-05-21

## 2024-05-20 RX ORDER — OXYCODONE AND ACETAMINOPHEN 5; 325 MG/1; MG/1
1 TABLET ORAL EVERY 6 HOURS
Refills: 0 | Status: DISCONTINUED | OUTPATIENT
Start: 2024-05-20 | End: 2024-05-20

## 2024-05-20 RX ORDER — CEFAZOLIN SODIUM 1 G
2000 VIAL (EA) INJECTION EVERY 8 HOURS
Refills: 0 | Status: COMPLETED | OUTPATIENT
Start: 2024-05-20 | End: 2024-05-20

## 2024-05-20 RX ORDER — LABETALOL HCL 100 MG
10 TABLET ORAL
Refills: 0 | Status: DISCONTINUED | OUTPATIENT
Start: 2024-05-20 | End: 2024-05-21

## 2024-05-20 RX ORDER — ONDANSETRON 8 MG/1
4 TABLET, FILM COATED ORAL ONCE
Refills: 0 | Status: DISCONTINUED | OUTPATIENT
Start: 2024-05-20 | End: 2024-05-20

## 2024-05-20 RX ADMIN — LAMOTRIGINE 200 MILLIGRAM(S): 25 TABLET, ORALLY DISINTEGRATING ORAL at 17:50

## 2024-05-20 RX ADMIN — Medication 2000 MILLIGRAM(S): at 21:57

## 2024-05-20 RX ADMIN — OXYCODONE HYDROCHLORIDE 5 MILLIGRAM(S): 5 TABLET ORAL at 14:52

## 2024-05-20 RX ADMIN — HYDROMORPHONE HYDROCHLORIDE 0.5 MILLIGRAM(S): 2 INJECTION INTRAMUSCULAR; INTRAVENOUS; SUBCUTANEOUS at 11:25

## 2024-05-20 RX ADMIN — OXYCODONE HYDROCHLORIDE 10 MILLIGRAM(S): 5 TABLET ORAL at 20:32

## 2024-05-20 RX ADMIN — GABAPENTIN 300 MILLIGRAM(S): 400 CAPSULE ORAL at 21:57

## 2024-05-20 RX ADMIN — POLYETHYLENE GLYCOL 3350 17 GRAM(S): 17 POWDER, FOR SOLUTION ORAL at 17:50

## 2024-05-20 RX ADMIN — OXYCODONE HYDROCHLORIDE 10 MILLIGRAM(S): 5 TABLET ORAL at 21:32

## 2024-05-20 RX ADMIN — Medication 1 APPLICATION(S): at 06:35

## 2024-05-20 RX ADMIN — Medication 975 MILLIGRAM(S): at 06:29

## 2024-05-20 RX ADMIN — LURASIDONE HYDROCHLORIDE 40 MILLIGRAM(S): 40 TABLET ORAL at 21:57

## 2024-05-20 RX ADMIN — NORTRIPTYLINE HYDROCHLORIDE 100 MILLIGRAM(S): 10 CAPSULE ORAL at 21:57

## 2024-05-20 RX ADMIN — SENNA PLUS 2 TABLET(S): 8.6 TABLET ORAL at 21:57

## 2024-05-20 RX ADMIN — Medication 400 MILLIGRAM(S): at 19:00

## 2024-05-20 RX ADMIN — METHOCARBAMOL 500 MILLIGRAM(S): 500 TABLET, FILM COATED ORAL at 11:25

## 2024-05-20 RX ADMIN — Medication 2000 MILLIGRAM(S): at 13:53

## 2024-05-20 RX ADMIN — OXYCODONE HYDROCHLORIDE 5 MILLIGRAM(S): 5 TABLET ORAL at 13:52

## 2024-05-20 RX ADMIN — MONTELUKAST 10 MILLIGRAM(S): 4 TABLET, CHEWABLE ORAL at 17:50

## 2024-05-20 NOTE — PHYSICAL THERAPY INITIAL EVALUATION ADULT - PERTINENT HX OF CURRENT PROBLEM, REHAB EVAL
Kelsy 3, 2022         [address]  [place] [address]  Phone: [phone]  Fax: [phone]       Patient: So Winkler   YOB: 1975  Date of Visit: 06/03/2022    To Whom It May Concern:    Sarah Winkler  was at [facility] on 06/03/2022. The patient may return to work/school on Monday, June 6th, 2022 with restriction to light duty. If you have any questions or concerns, or if I can be of further assistance, please do not hesitate to contact me.    Sincerely,    MD Kelly Zuniga RN  [phone]     
48 Year old female with PMH of brain injury 6 years ago, anemia treated with iron infusions, depression, headaches, seizures, here for superficial bump on right side of head and is not s/p right sided craniectomy POD#0. Patient seen and examined beside and endorses headache. Denies any changes in vision, difficulty breathing, chest pain, palpitations. AOx3, GCS 15, full strength throughout, dressing clean dry and intact. Post operative CT head shows minimal subjacent extra axial hemorrhage.

## 2024-05-20 NOTE — BRIEF OPERATIVE NOTE - NSICDXBRIEFPROCEDURE_GEN_ALL_CORE_FT
PROCEDURES:  Craniectomy, with lesion excision 20-May-2024 09:02:54 Right sided craniectomy with lesion excision Mikel Gonzalez

## 2024-05-20 NOTE — PROGRESS NOTE ADULT - ASSESSMENT
48 Year old female with PMH of brain injury 6 years ago, anemia treated with iron infusions, depression, headaches, seizures, here for superficial bump on right side of head and is not s/p right sided craniectomy POD#0. Patient seen and examined beside and endorses headache. Denies any changes in vision, difficulty breathing, chest pain, palpitations. AOx3, GCS 15, full strength throughout, dressing clean dry and intact. Post operative CT head shows minimal subjacent extra axial hemorrhage.      PLAN:  -Q4h neuro checks  -CTH post operative, completed and reviewed  -STAT CTH for any changes in neuro exam  -Drain: none  -Pain control prn, avoid oversedation  --160  -Ancef x3 doses including the dose in OR  -Dysphagia screen and then advance diet as tolerated  -Bowel regimen: senna, miralax  -VTE prophylaxis: SCDs, hold chemoprophylaxis due to: post-op bleeding risk  -Activity: PT, OT  -Discussed case with Dr. Samson

## 2024-05-20 NOTE — BRIEF OPERATIVE NOTE - COMMENTS
Right sided craniectomy with tumor excision. Patient tolerated procedure well without complication.  . Small right sided craniectomy with tumor excision, large alicia hole cover placed. Patient tolerated procedure well without complication.

## 2024-05-20 NOTE — BRIEF OPERATIVE NOTE - NSICDXBRIEFPOSTOP_GEN_ALL_CORE_FT
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

## 2024-05-20 NOTE — BRIEF OPERATIVE NOTE - NSICDXBRIEFPREOP_GEN_ALL_CORE_FT
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

## 2024-05-20 NOTE — PHYSICAL THERAPY INITIAL EVALUATION ADULT - ADDITIONAL COMMENTS
Pt reports living alone in an apartment with 0 JAMES. Pt amb without device and is independent with functional mobility, ADLs, and IADLs. Pt does not drive and is on disability. Pt has support of friends, pt reports will have friends stay with her after D/C. Pt owns no DME

## 2024-05-20 NOTE — PROGRESS NOTE ADULT - SUBJECTIVE AND OBJECTIVE BOX
POST-OPERATIVE NOTE    Procedure: Right sided craniectomy with tumor excision    Diagnosis/Indication: Right anterior parietal calvarial osseous lesion with mild expansile   features measures 1.7 x 1.0 x 0.6 cm.    Surgeon: Dr. Samson    INTERVAL HPI/ACUTE EVENTS:  48 Year old female with PMH of brain injury 6 years ago, anemia treated with iron infusions, depression, headaches, seizures, here for superficial bump on right side of head and is not s/p right sided craniectomy POD#0. Patient seen and examined beside and endorses headache. Denies any changes in vision, difficulty breathing, chest pain, palpitations.      VITALS:  T(C): 36.9 (05-20-24 @ 13:25), Max: 36.9 (05-20-24 @ 13:25)  HR: 74 (05-20-24 @ 13:25) (70 - 87)  BP: 110/70 (05-20-24 @ 13:25) (100/70 - 136/86)  RR: 18 (05-20-24 @ 13:25) (14 - 21)  SpO2: 96% (05-20-24 @ 13:25) (96% - 100%)  Wt(kg): --    PHYSICAL EXAM:  GENERAL: NAD, well-groomed  HEAD:  S/p right crani. Dressing clean, dry, intact. No blood noted on head wrap.  DRAINS: No drains  WOUND: Dressing clean dry intact  ELLYN COMA SCORE: E- V- M- = 15       E: 4= opens eyes spontaneously       V: 5= oriented       M: 6= follows commands   MENTAL STATUS: AAO x3; Awake; Opens eyes spontaneously; Appropriately conversant without aphasia; following simple commands  CRANIAL NERVES: PERRL. EOMI without nystagmus. Facial sensation intact V1-3 distribution b/l. Face symmetric w/ normal eye closure and smile, tongue midline. Hearing grossly intact. Speech clear. Head turning and shoulder shrug intact.   MOTOR: strength 5/5 b/l upper and lower extremities  Uppers     Delt (C5/6)     Bicep (C5/6)     Wrist Extend (C6)     Tricep (C7)     HG (C8/T1)  R                     5/5                 5/5                         5/5                           5/5                   5/5  L                      5/5                 5/5                         5/5                           5/5                   5/5  Lowers      HF(L1/L2)     KE (L3)     DF (L4)     EHL (L5)     PF (S1)      R                     5/5              5/5           5/5           5/5            5/5  L                     5/5               5/5          5/5            5/5            5/5  SENSATION: grossly intact to light touch all extremities  CHEST/LUNG: equal bilateral chest rise, no accessory muscle usage  HEART: regular rate by palpation  ABDOMEN: Soft, nontender, nondistended  EXTREMITIES: no clubbing, cyanosis, or edema  SKIN: Warm, dry; no rashes or lesions    LABS:      RADIOLOGY/OTHER:  CT Head No Cont (05.20.24 @ 12:58) >  Interval right frontal alicia hole. Minimal subjacent extra-axial   hemorrhage.  No hydrocephalus, acute parenchymal hemorrhage, mass effect, or brain   edema.    CT Head No Cont (03.30.24 @ 11:37) >    Right anterior parietal calvarial osseous lesion with mild expansile   features measures 1.7 x 1.0 x 0.6 cm. There is groundglass attenuation to   it. Differential diagnosis is not limited to includes fibrous dysplasia,   osseous hemangioma, Paget's disease, less likely metastasis/neoplasm. No   intracranial extension. No aggressive features. No soft tissue component.   No other calvarial lesion. The calvarium is otherwise intact.    Consider MRI as clinically warranted.    IMPRESSION: No evidence of an acute transcortical infarction or   hemorrhage. Right parietal calvarial lesion.

## 2024-05-20 NOTE — PROGRESS NOTE ADULT - NS ATTEND AMEND GEN_ALL_CORE FT
I agree with the above. I personally examined and saw the patient. Neurointact. Postop head CT demonstrates complete resection of skull lesion. Plan as above.

## 2024-05-21 ENCOUNTER — TRANSCRIPTION ENCOUNTER (OUTPATIENT)
Age: 49
End: 2024-05-21

## 2024-05-21 VITALS
RESPIRATION RATE: 19 BRPM | OXYGEN SATURATION: 98 % | HEART RATE: 73 BPM | SYSTOLIC BLOOD PRESSURE: 108 MMHG | TEMPERATURE: 98 F | DIASTOLIC BLOOD PRESSURE: 68 MMHG

## 2024-05-21 LAB
ALBUMIN SERPL ELPH-MCNC: 3.8 G/DL — SIGNIFICANT CHANGE UP (ref 3.3–5.2)
ALP SERPL-CCNC: 50 U/L — SIGNIFICANT CHANGE UP (ref 40–120)
ALT FLD-CCNC: 18 U/L — SIGNIFICANT CHANGE UP
ANION GAP SERPL CALC-SCNC: 12 MMOL/L — SIGNIFICANT CHANGE UP (ref 5–17)
AST SERPL-CCNC: 16 U/L — SIGNIFICANT CHANGE UP
BILIRUB SERPL-MCNC: <0.2 MG/DL — LOW (ref 0.4–2)
BUN SERPL-MCNC: 10.7 MG/DL — SIGNIFICANT CHANGE UP (ref 8–20)
CALCIUM SERPL-MCNC: 9 MG/DL — SIGNIFICANT CHANGE UP (ref 8.4–10.5)
CHLORIDE SERPL-SCNC: 100 MMOL/L — SIGNIFICANT CHANGE UP (ref 96–108)
CO2 SERPL-SCNC: 26 MMOL/L — SIGNIFICANT CHANGE UP (ref 22–29)
CREAT SERPL-MCNC: 0.83 MG/DL — SIGNIFICANT CHANGE UP (ref 0.5–1.3)
EGFR: 88 ML/MIN/1.73M2 — SIGNIFICANT CHANGE UP
GLUCOSE SERPL-MCNC: 99 MG/DL — SIGNIFICANT CHANGE UP (ref 70–99)
MAGNESIUM SERPL-MCNC: 1.6 MG/DL — SIGNIFICANT CHANGE UP (ref 1.6–2.6)
PHOSPHATE SERPL-MCNC: 4.9 MG/DL — HIGH (ref 2.4–4.7)
POTASSIUM SERPL-MCNC: 3.7 MMOL/L — SIGNIFICANT CHANGE UP (ref 3.5–5.3)
POTASSIUM SERPL-SCNC: 3.7 MMOL/L — SIGNIFICANT CHANGE UP (ref 3.5–5.3)
PROT SERPL-MCNC: 6 G/DL — LOW (ref 6.6–8.7)
SODIUM SERPL-SCNC: 138 MMOL/L — SIGNIFICANT CHANGE UP (ref 135–145)

## 2024-05-21 PROCEDURE — C1713: CPT

## 2024-05-21 PROCEDURE — 97110 THERAPEUTIC EXERCISES: CPT

## 2024-05-21 PROCEDURE — 88307 TISSUE EXAM BY PATHOLOGIST: CPT

## 2024-05-21 PROCEDURE — 97116 GAIT TRAINING THERAPY: CPT

## 2024-05-21 PROCEDURE — 70450 CT HEAD/BRAIN W/O DYE: CPT | Mod: MC

## 2024-05-21 PROCEDURE — 80053 COMPREHEN METABOLIC PANEL: CPT

## 2024-05-21 PROCEDURE — 84100 ASSAY OF PHOSPHORUS: CPT

## 2024-05-21 PROCEDURE — 88311 DECALCIFY TISSUE: CPT

## 2024-05-21 PROCEDURE — 83735 ASSAY OF MAGNESIUM: CPT

## 2024-05-21 PROCEDURE — 36415 COLL VENOUS BLD VENIPUNCTURE: CPT

## 2024-05-21 PROCEDURE — C1889: CPT

## 2024-05-21 PROCEDURE — C9399: CPT

## 2024-05-21 RX ORDER — POLYETHYLENE GLYCOL 3350 17 G/17G
17 POWDER, FOR SOLUTION ORAL
Qty: 0 | Refills: 0 | DISCHARGE
Start: 2024-05-21

## 2024-05-21 RX ORDER — SENNA PLUS 8.6 MG/1
2 TABLET ORAL
Qty: 0 | Refills: 0 | DISCHARGE
Start: 2024-05-21

## 2024-05-21 RX ADMIN — OXYCODONE HYDROCHLORIDE 10 MILLIGRAM(S): 5 TABLET ORAL at 10:30

## 2024-05-21 RX ADMIN — OXYCODONE HYDROCHLORIDE 5 MILLIGRAM(S): 5 TABLET ORAL at 05:59

## 2024-05-21 RX ADMIN — LAMOTRIGINE 200 MILLIGRAM(S): 25 TABLET, ORALLY DISINTEGRATING ORAL at 05:59

## 2024-05-21 RX ADMIN — OXYCODONE HYDROCHLORIDE 10 MILLIGRAM(S): 5 TABLET ORAL at 09:42

## 2024-05-21 NOTE — OCCUPATIONAL THERAPY INITIAL EVALUATION ADULT - DIAGNOSIS, OT EVAL
48 year old Female s/p right sided craniectomy (5/20). Post operative CT head shows minimal subjacent extra axial hemorrhage

## 2024-05-21 NOTE — DISCHARGE NOTE PROVIDER - HOSPITAL COURSE
48 Year old female with PMH of brain injury 6 years ago, anemia treated with iron infusions, depression, headaches, seizures, here for superficial bump on right side of head. Patient POD 1 from R craniectomy for tumor excision. Doing well post operatively. Staple incision open to air, C/D/I. Patient complaining of incisional pain with relief, no c/o of pain today. PT recommending home with outpatient PT and use of cane. Patient neuro exam intact, A&O x 3, following commands, PERRLA, EOMI, cranial nerves grossly intact, strength 5/5 in all extremities, SILT.  On day of discharge patient medically and neurologically cleared for d/c home. 48 Year old female with PMH of brain injury 6 years ago, anemia treated with iron infusions, depression, headaches, seizures, here for superficial bump on right side of head. Patient POD 1 from R craniectomy for tumor excision. Doing well post operatively. Staple incision open to air, C/D/I. Patient was complaining of incisional pain with relief after oxy, no c/o of pain today. PT recommending home with outpatient PT and use of straight cane for gait stability. Patient neuro exam intact, A&O x 3, following commands, PERRLA, EOMI, cranial nerves grossly intact, strength 5/5 in all extremities, SILT.  On day of discharge patient medically, hemodynamically and neurologically cleared for d/c home. 48 Year old female with PMH of brain injury 6 years ago, anemia treated with iron infusions, depression, headaches, seizures, here for superficial bump on right side of head. Patient POD 1 from R craniectomy for tumor excision. Doing well post operatively. Staple incision open to air, C/D/I. Patient was complaining of incisional pain with relief after oxy, no c/o of pain today. PT recommending home with outpatient PT and use of straight cane for gait stability. OT recommending use of shower chair. Patient neuro exam intact, A&O x 3, following commands, PERRLA, EOMI, cranial nerves grossly intact, strength 5/5 in all extremities, SILT.  On day of discharge patient medically, hemodynamically and neurologically cleared for d/c home. 48 Year old female with PMH of brain injury 6 years ago, anemia treated with iron infusions, depression, headaches, seizures, here for superficial bump on right side of head. Patient POD 1 from R craniectomy for skull lesion excision. Doing well post operatively. Staple incision open to air, C/D/I. Patient was complaining of incisional pain with relief after oxy, no c/o of pain today. PT recommending home with outpatient PT and use of straight cane for gait stability. OT recommending use of shower chair. Patient neuro exam intact, A&O x 3, following commands, PERRLA, EOMI, cranial nerves grossly intact, strength 5/5 in all extremities, SILT.  On day of discharge patient medically, hemodynamically and neurologically cleared for d/c home.

## 2024-05-21 NOTE — DISCHARGE NOTE NURSING/CASE MANAGEMENT/SOCIAL WORK - PATIENT PORTAL LINK FT
You can access the FollowMyHealth Patient Portal offered by [facility] by registering at the following website: [email or web address]. By joining [facility]’s FollowMyHealth portal, you will also be able to view your health information using other applications (apps) compatible with our system.

## 2024-05-21 NOTE — OCCUPATIONAL THERAPY INITIAL EVALUATION ADULT - VISUAL ACUITY
-Patient with elevated alk phos from baseline  -Concern for biliary statis in the setting of hepatic metastatic disease potentially causing cholangitis (RUQ pain, jaundice, fever, altered mentation)  -Per ID, ordered CT A/P  -Continue Cefepime +glasses (present at eval); no complaints in vision offered. Central and peripheral fields intact bilaterally

## 2024-05-21 NOTE — DISCHARGE NOTE PROVIDER - NSDCCPTREATMENT_GEN_ALL_CORE_FT
PRINCIPAL PROCEDURE  Procedure: Craniectomy, with lesion excision  Findings and Treatment: Please make an appointment for follow up with neurosurgeon Dr. Maeve Mccurdy' office in 1-2 weeks for wound check. Call [phone] to schedule an appointment. Staples will be removed at follow up appointment. Keep incision site clean and dry. No creams, lotions, or ointments to incision area. Ok to shower but do not allow water to hit incision site directly. Gently pat dry after shower.  NO heavy lifting, strenuous activity, twisting, bending, driving, or working until cleared by your physician.  Return to ER immediately for any of the following: fever, bleeding, new onset numbness/tingling/weakness, nausea and/or vomiting, chest pain, shortness of breath, confusion, seizure, altered mental status, urinary and/or fecal incontinence or retention.

## 2024-05-21 NOTE — OCCUPATIONAL THERAPY INITIAL EVALUATION ADULT - PERTINENT HX OF CURRENT PROBLEM, REHAB EVAL
PMH of brain injury 6 years ago, anemia treated with iron infusions, depression, headaches, seizures, here for superficial bump on right side of head.

## 2024-05-21 NOTE — DISCHARGE NOTE PROVIDER - NSDCCPCAREPLAN_GEN_ALL_CORE_FT
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

## 2024-05-21 NOTE — OCCUPATIONAL THERAPY INITIAL EVALUATION ADULT - GENERAL OBSERVATIONS, REHAB EVAL
Left pt as received semifowler in bed, NAD, +IV lock on RA A&Ox4. Pre/post pain 5/10, headache, dizziness has improved; RN aware, pain meds on board. Pt agreeable to OT evaluation

## 2024-05-21 NOTE — DISCHARGE NOTE PROVIDER - NSDCMRMEDTOKEN_GEN_ALL_CORE_FT
Adderall 20 mg oral tablet: 1 tab(s) orally 3 times a day  Allegra 24 Hour Allergy oral tablet: 1 tab(s) orally once a day  gabapentin 300 mg oral capsule: 1 cap(s) orally once a day  LaMICtal 200 mg oral tablet: 1 tab(s) orally 2 times a day  Latuda 40 mg oral tablet: 1 tab(s) orally once a day  meloxicam 10 mg oral capsule: 1 cap(s) orally once a day  methocarbamol 500 mg oral tablet: 2 tab(s) orally 2 times a day as needed for  mild pain  montelukast 10 mg oral tablet: 1 tab(s) orally once a day  nortriptyline 50 mg oral capsule: 2 cap(s) orally once a day   Adderall 20 mg oral tablet: 1 tab(s) orally 3 times a day  Allegra 24 Hour Allergy oral tablet: 1 tab(s) orally once a day  gabapentin 300 mg oral capsule: 1 cap(s) orally once a day  LaMICtal 200 mg oral tablet: 1 tab(s) orally 2 times a day  Latuda 40 mg oral tablet: 1 tab(s) orally once a day  meloxicam 10 mg oral capsule: 1 cap(s) orally once a day  methocarbamol 500 mg oral tablet: 2 tab(s) orally 2 times a day as needed for  mild pain  montelukast 10 mg oral tablet: 1 tab(s) orally once a day  nortriptyline 50 mg oral capsule: 2 cap(s) orally once a day  Physical Therapy: 3- 5 x per/week for 5 days as per PT recommendations  polyethylene glycol 3350 oral powder for reconstitution: 17 gram(s) orally once a day  senna leaf extract oral tablet: 2 tab(s) orally once a day (at bedtime)  Straight Cane: Straight Cane per PT recommendations   Adderall 20 mg oral tablet: 1 tab(s) orally 3 times a day  Allegra 24 Hour Allergy oral tablet: 1 tab(s) orally once a day  gabapentin 300 mg oral capsule: 1 cap(s) orally once a day  LaMICtal 200 mg oral tablet: 1 tab(s) orally 2 times a day  Latuda 40 mg oral tablet: 1 tab(s) orally once a day  meloxicam 10 mg oral capsule: 1 cap(s) orally once a day  methocarbamol 500 mg oral tablet: 2 tab(s) orally 2 times a day as needed for  mild pain  montelukast 10 mg oral tablet: 1 tab(s) orally once a day  nortriptyline 50 mg oral capsule: 2 cap(s) orally once a day  Physical Therapy: 3- 5 x per/week for 5 days as per PT recommendations  polyethylene glycol 3350 oral powder for reconstitution: 17 gram(s) orally once a day  senna leaf extract oral tablet: 2 tab(s) orally once a day (at bedtime)  Shower Chair: Shower Chair as per OT recommendations  Straight Cane: Straight Cane per PT recommendations

## 2024-05-21 NOTE — PATIENT PROFILE ADULT - FALL HARM RISK - HARM RISK INTERVENTIONS

## 2024-05-21 NOTE — DISCHARGE NOTE PROVIDER - NSDCFUADDINST_GEN_ALL_CORE_FT
Please make an appointment for follow up with neurosurgeon Dr. Chris Bess's office in 1-2 weeks for wound check. Call [phone] to schedule an appointment. Sutures/staples will be removed at follow up appointment. Keep incision site clean and dry. No creams, lotions, or ointments to incision area. Ok to shower but do not allow water to hit incision site directly. Gently pat dry after shower.    NO heavy lifting, strenuous activity, twisting, bending, driving, or working until cleared by your physician.  Return to ER immediately for any of the following: fever, bleeding, new onset numbness/tingling/weakness, nausea and/or vomiting, chest pain, shortness of breath, confusion, seizure, altered mental status, urinary and/or fecal incontinence or retention.    You will follow up with Dr. Samson in the office for staple removal and wound check in 2 weeks.     .

## 2024-05-21 NOTE — DISCHARGE NOTE PROVIDER - CARE PROVIDER_API CALL
Maeve Samson  Neurosurgery  [address] [place] [id number]  Phone: [phone]  Fax: [phone]  Follow Up Time: 2 weeks

## 2024-05-21 NOTE — OCCUPATIONAL THERAPY INITIAL EVALUATION ADULT - ADDITIONAL COMMENTS
Pt has a shower with doors and no grab bars. Pt independent PTA without device. Pt owns no DM. Pt is right handed and does not drive. Pt states has friends that are supportive and can stay with her upon discharge.

## 2024-05-21 NOTE — OCCUPATIONAL THERAPY INITIAL EVALUATION ADULT - PHYSICAL ASSIST/NONPHYSICAL ASSIST:DRESS LOWER BODY, OT EVAL
reviewed compensatory dressing techniques to decrease bending/position changes 2*dizziness. Pt with good understanding and good flexibility

## 2024-05-23 PROBLEM — F32.9 MAJOR DEPRESSIVE DISORDER, SINGLE EPISODE, UNSPECIFIED: Chronic | Status: ACTIVE | Noted: 2024-05-15

## 2024-05-23 PROBLEM — F19.91: Chronic | Status: ACTIVE | Noted: 2024-05-15

## 2024-05-23 PROBLEM — Z87.820 PERSONAL HISTORY OF TRAUMATIC BRAIN INJURY: Chronic | Status: ACTIVE | Noted: 2024-05-15

## 2024-05-23 PROBLEM — Z87.891 PERSONAL HISTORY OF NICOTINE DEPENDENCE: Chronic | Status: ACTIVE | Noted: 2024-05-15

## 2024-05-23 PROBLEM — F10.20 ALCOHOL DEPENDENCE, UNCOMPLICATED: Chronic | Status: ACTIVE | Noted: 2024-05-15

## 2024-05-23 PROBLEM — Z92.29 PERSONAL HISTORY OF OTHER DRUG THERAPY: Chronic | Status: ACTIVE | Noted: 2024-05-15

## 2024-05-23 PROBLEM — Z87.898 PERSONAL HISTORY OF OTHER SPECIFIED CONDITIONS: Chronic | Status: ACTIVE | Noted: 2024-05-15

## 2024-05-23 PROBLEM — D64.9 ANEMIA, UNSPECIFIED: Chronic | Status: ACTIVE | Noted: 2024-05-15

## 2024-05-23 PROBLEM — J30.2 OTHER SEASONAL ALLERGIC RHINITIS: Chronic | Status: ACTIVE | Noted: 2024-05-15

## 2024-05-23 PROBLEM — F98.8 OTHER SPECIFIED BEHAVIORAL AND EMOTIONAL DISORDERS WITH ONSET USUALLY OCCURRING IN CHILDHOOD AND ADOLESCENCE: Chronic | Status: ACTIVE | Noted: 2024-05-15

## 2024-06-04 LAB — SURGICAL PATHOLOGY STUDY: SIGNIFICANT CHANGE UP

## 2024-06-07 ENCOUNTER — APPOINTMENT (OUTPATIENT)
Dept: NEUROSURGERY | Facility: CLINIC | Age: 49
End: 2024-06-07
Payer: MEDICARE

## 2024-06-07 VITALS
HEART RATE: 94 BPM | HEIGHT: 66 IN | SYSTOLIC BLOOD PRESSURE: 111 MMHG | OXYGEN SATURATION: 96 % | BODY MASS INDEX: 20.09 KG/M2 | WEIGHT: 125 LBS | DIASTOLIC BLOOD PRESSURE: 71 MMHG | TEMPERATURE: 98.9 F

## 2024-06-07 DIAGNOSIS — Z98.890 OTHER SPECIFIED POSTPROCEDURAL STATES: ICD-10-CM

## 2024-06-07 DIAGNOSIS — M89.9 DISORDER OF BONE, UNSPECIFIED: ICD-10-CM

## 2024-06-07 PROCEDURE — 99024 POSTOP FOLLOW-UP VISIT: CPT

## 2024-06-07 NOTE — HISTORY OF PRESENT ILLNESS
[FreeTextEntry1] : Ms. Dacia Barron (Chrissy) is a very pleasant 48 year old female with a history of TBI, chronic neck pain who presents for her postop visit after right craniectomy on 5/20/2024 for a skull lesion.  She reports that she is doing well.  Her preoperative seizure-like activity has stopped.  No new issues or symptoms.  No issues with her incision.

## 2024-06-07 NOTE — ASSESSMENT
[FreeTextEntry1] : Ms. Pederson "Connie" Anderson is a very pleasant 48 year old female with a history of TBI, chronic neck pain who presents for her postop visit after right craniectomy on 5/20/2024 for a skull lesion.  She was doing well.  I will see her back in 1 month.  We reviewed the do's and don'ts of activity. All questions answered.  She knows to call my office with any issues or concerns.

## 2024-07-10 ENCOUNTER — APPOINTMENT (OUTPATIENT)
Dept: NEUROSURGERY | Facility: CLINIC | Age: 49
End: 2024-07-10
Payer: MEDICARE

## 2024-07-10 VITALS
SYSTOLIC BLOOD PRESSURE: 118 MMHG | HEIGHT: 66 IN | WEIGHT: 125 LBS | DIASTOLIC BLOOD PRESSURE: 81 MMHG | TEMPERATURE: 98.4 F | OXYGEN SATURATION: 97 % | BODY MASS INDEX: 20.09 KG/M2 | HEART RATE: 86 BPM

## 2024-07-10 DIAGNOSIS — Z98.890 OTHER SPECIFIED POSTPROCEDURAL STATES: ICD-10-CM

## 2024-07-10 DIAGNOSIS — M89.9 DISORDER OF BONE, UNSPECIFIED: ICD-10-CM

## 2024-07-10 PROCEDURE — 99024 POSTOP FOLLOW-UP VISIT: CPT

## 2025-01-14 ENCOUNTER — APPOINTMENT (OUTPATIENT)
Dept: NEUROSURGERY | Facility: CLINIC | Age: 50
End: 2025-01-14

## 2025-02-19 ENCOUNTER — APPOINTMENT (OUTPATIENT)
Dept: RHEUMATOLOGY | Facility: CLINIC | Age: 50
End: 2025-02-19
Payer: MEDICARE

## 2025-02-19 DIAGNOSIS — M79.10 MYALGIA, UNSPECIFIED SITE: ICD-10-CM

## 2025-02-19 DIAGNOSIS — M25.50 PAIN IN UNSPECIFIED JOINT: ICD-10-CM

## 2025-02-19 DIAGNOSIS — R53.82 CHRONIC FATIGUE, UNSPECIFIED: ICD-10-CM

## 2025-02-19 DIAGNOSIS — M50.30 OTHER CERVICAL DISC DEGENERATION, UNSPECIFIED CERVICAL REGION: ICD-10-CM

## 2025-02-19 DIAGNOSIS — M54.12 RADICULOPATHY, CERVICAL REGION: ICD-10-CM

## 2025-02-19 DIAGNOSIS — M79.7 FIBROMYALGIA: ICD-10-CM

## 2025-02-19 PROCEDURE — 99214 OFFICE O/P EST MOD 30 MIN: CPT | Mod: 2W

## 2025-02-19 PROCEDURE — G2211 COMPLEX E/M VISIT ADD ON: CPT | Mod: 2W

## (undated) DEVICE — LONE STAR RETRACTOR RING 12MM BLUNT DISP

## (undated) DEVICE — WARMING BLANKET LOWER ADULT

## (undated) DEVICE — SUT ETHILON 3-0 18" PS-1

## (undated) DEVICE — TUBING CONNECTING 6MM 20FT

## (undated) DEVICE — DRAPE XL SHEET 77X98"

## (undated) DEVICE — SUT NUROLON 4-0 8-18" TF (POP-OFF)

## (undated) DEVICE — PACK CRANIOTOMY

## (undated) DEVICE — DRSG TELFA 4 X 8

## (undated) DEVICE — CATH IV INTROCAN SAFETY 14G X 1.25" (ORANGE) FEP

## (undated) DEVICE — PREP BETADINE KIT

## (undated) DEVICE — GLV 6.5 PROTEXIS (WHITE)

## (undated) DEVICE — GLV 6.5 PROTEXIS (BLUE)

## (undated) DEVICE — SUT VICRYL 0 18" CT-1 UNDYED (POP-OFF)

## (undated) DEVICE — VENODYNE/SCD SLEEVE CALF MEDIUM

## (undated) DEVICE — DRAIN JACKSON PRATT 3 SPRING RESERVOIR W 10FR PVC DRAIN

## (undated) DEVICE — TUBING FOR SMOKE EVACUATOR (PURPLE END)

## (undated) DEVICE — SPONGE SURGICAL STRIP 1" X 6"

## (undated) DEVICE — DRAPE CRANIOTOMY W POUCH 100X104"

## (undated) DEVICE — DRSG XEROFORM 5 X 9"

## (undated) DEVICE — DRAPE INSTRUMENT POUCH 6.75" X 11"

## (undated) DEVICE — DRSG KERLIX ROLL 4.5"

## (undated) DEVICE — SPONGE SURGICAL STRIP 1/2 X 6"

## (undated) DEVICE — TUBING BIPOLAR IRRIGATOR AND CORD SET